# Patient Record
Sex: FEMALE | HISPANIC OR LATINO | Employment: STUDENT | URBAN - METROPOLITAN AREA
[De-identification: names, ages, dates, MRNs, and addresses within clinical notes are randomized per-mention and may not be internally consistent; named-entity substitution may affect disease eponyms.]

---
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
## 2019-04-17 ENCOUNTER — OFFICE VISIT (OUTPATIENT)
Dept: PEDIATRICS CLINIC | Facility: CLINIC | Age: 19
End: 2019-04-17
Payer: COMMERCIAL

## 2019-04-17 VITALS
RESPIRATION RATE: 18 BRPM | HEIGHT: 63 IN | SYSTOLIC BLOOD PRESSURE: 100 MMHG | BODY MASS INDEX: 18.69 KG/M2 | DIASTOLIC BLOOD PRESSURE: 70 MMHG | TEMPERATURE: 97.8 F | WEIGHT: 105.5 LBS | HEART RATE: 84 BPM

## 2019-04-17 DIAGNOSIS — Z00.129 WELL ADOLESCENT VISIT: Primary | ICD-10-CM

## 2019-04-17 DIAGNOSIS — R62.7 POOR WEIGHT GAIN IN ADULT: ICD-10-CM

## 2019-04-17 DIAGNOSIS — Z71.82 EXERCISE COUNSELING: ICD-10-CM

## 2019-04-17 DIAGNOSIS — Z13.220 SCREENING, LIPID: ICD-10-CM

## 2019-04-17 DIAGNOSIS — Z71.3 NUTRITIONAL COUNSELING: ICD-10-CM

## 2019-04-17 DIAGNOSIS — Z13.31 SCREENING FOR DEPRESSION: ICD-10-CM

## 2019-04-17 PROCEDURE — 99395 PREV VISIT EST AGE 18-39: CPT | Performed by: PEDIATRICS

## 2019-04-17 PROCEDURE — 96127 BRIEF EMOTIONAL/BEHAV ASSMT: CPT | Performed by: PEDIATRICS

## 2019-04-17 PROCEDURE — 1036F TOBACCO NON-USER: CPT | Performed by: PEDIATRICS

## 2019-04-17 PROCEDURE — 3008F BODY MASS INDEX DOCD: CPT | Performed by: PEDIATRICS

## 2019-11-02 ENCOUNTER — IMMUNIZATIONS (OUTPATIENT)
Dept: PEDIATRICS CLINIC | Facility: CLINIC | Age: 19
End: 2019-11-02
Payer: COMMERCIAL

## 2019-11-02 DIAGNOSIS — Z23 ENCOUNTER FOR IMMUNIZATION: Primary | ICD-10-CM

## 2019-11-02 PROCEDURE — 90686 IIV4 VACC NO PRSV 0.5 ML IM: CPT | Performed by: PEDIATRICS

## 2019-11-02 PROCEDURE — 90471 IMMUNIZATION ADMIN: CPT | Performed by: PEDIATRICS

## 2019-12-03 ENCOUNTER — OFFICE VISIT (OUTPATIENT)
Dept: URGENT CARE | Age: 19
End: 2019-12-03
Payer: COMMERCIAL

## 2019-12-03 VITALS
BODY MASS INDEX: 18.61 KG/M2 | TEMPERATURE: 98.3 F | RESPIRATION RATE: 16 BRPM | WEIGHT: 105 LBS | HEART RATE: 107 BPM | HEIGHT: 63 IN | SYSTOLIC BLOOD PRESSURE: 100 MMHG | DIASTOLIC BLOOD PRESSURE: 59 MMHG | OXYGEN SATURATION: 98 %

## 2019-12-03 DIAGNOSIS — R10.33 PERIUMBILICAL ABDOMINAL PAIN: Primary | ICD-10-CM

## 2019-12-03 PROCEDURE — 99213 OFFICE O/P EST LOW 20 MIN: CPT | Performed by: FAMILY MEDICINE

## 2019-12-03 PROCEDURE — S9088 SERVICES PROVIDED IN URGENT: HCPCS | Performed by: FAMILY MEDICINE

## 2019-12-03 NOTE — LETTER
December 3, 2019     Patient: Anastasiya Adams   YOB: 2000   Date of Visit: 12/3/2019       To Whom it May Concern:    Anastasiya Adams was seen in my clinic on 12/3/2019  She was unable to attend classes today (12/03/2019)  Patient was brought to our office by her mother  If you have any questions or concerns, please don't hesitate to call           Sincerely,          Michaela Durant,         CC: No Recipients

## 2019-12-03 NOTE — PROGRESS NOTES
[facility]        NAME: Leatha Veronica is a 20 y o  female  : 2000    MRN: [id number]  DATE: December 3, 2019  TIME: 11:46 AM    Assessment and Plan   Periumbilical abdominal pain [R10 33]  1  Periumbilical abdominal pain           Patient Instructions     Patient Instructions   Light/BRAT diet (patient given a dietary instruction sheet)  Recheck/follow-up with family physician as needed  Please go to the hospital emergency department if needed  Follow up with PCP in 2-3 days  Proceed to  ER if symptoms worsen  Chief Complaint     Chief Complaint   Patient presents with    Abdominal Pain     Pt complaining of LUQ pain that started this morning  She has some nausea as well  Denies vomiting and diarrhea  Pt is not having any pain at this time  History of Present Illness       Patient with generalized abdominal pain since this morning with nausea; no vomiting or diarrhea; patient last ate last evening (rice and beans); no urinary complaints; patient states the abdominal pain is improving      Review of Systems   Review of Systems   Gastrointestinal: Positive for abdominal pain and nausea  Negative for diarrhea and vomiting  All other systems reviewed and are negative          Current Medications       Current Outpatient Medications:     cetirizine (ZyrTEC) 10 mg tablet, Take 10 mg by mouth daily, Disp: , Rfl:     EPINEPHrine (EPIPEN 2-JUANI) 0 3 mg/0 3 mL SOAJ, Inject 0 3 mL as directed, Disp: , Rfl:     fluticasone (FLOVENT HFA) 110 MCG/ACT inhaler, Inhale 2 puffs 2 (two) times a day, Disp: , Rfl:     cyproheptadine (PERIACTIN) 4 mg tablet, Take 2 tablets (8 mg total) by mouth daily at bedtime (Patient not taking: Reported on 2019), Disp: 30 tablet, Rfl: 2    levalbuterol (XOPENEX) 1 25 mg/3 mL nebulizer solution, Inhale 1 each, Disp: , Rfl:     montelukast (SINGULAIR) 10 mg tablet, Take 1 tablet by mouth, Disp: , Rfl:     polyethylene glycol (GLYCOLAX) powder, Take 34 g by mouth daily (Patient not taking: Reported on 12/11/2018 ), Disp: 1054 g, Rfl: 0    ranitidine (ZANTAC) 150 mg tablet, Take 1 tablet by mouth every 12 (twelve) hours, Disp: , Rfl:     senna (SENOKOT) 8 6 mg, Take 2 tablets (17 2 mg total) by mouth daily at bedtime (Patient not taking: Reported on 4/17/2019), Disp: 60 each, Rfl: 0    Current Allergies     Allergies as of 12/03/2019 - Reviewed 12/03/2019   Allergen Reaction Noted    Eggs or egg-derived products Hives 09/26/2017    Other Hives, Itching, and Other (See Comments) 01/30/2017    Seasonal ic  [cholestatin]  06/25/2014            The following portions of the patient's history were reviewed and updated as appropriate: allergies, current medications, past family history, past medical history, past social history, past surgical history and problem list      Past Medical History:   Diagnosis Date    Abdominal pain     Allergic     Asthma     Asthma     Chronic abdominal pain     Resolved: 12/07/2017    Chronic diarrhea     Resolved: 07/18/2017    Congenital hemihypertrophy     Early satiety     Exotropia     Gastric mucosal hypertrophy     Leg length discrepancy     Nausea     Occasional tremors     Orthostasis     Pallor     Resolved: 01/16/2017    Poor appetite     RAD (reactive airway disease)     Syncope     Resolved: 01/16/2017    Weight loss        Past Surgical History:   Procedure Laterality Date    ESOPHAGOGASTRODUODENOSCOPY      with biopsy    FOOT SURGERY      MULTIPLE TOOTH EXTRACTIONS N/A 1/30/2017    Procedure: COMPLETE BONY IMPACTED EXTRACTION TEETH # 1, 16, 17, 32;  Surgeon: Smith Burciaga DMD;  Location: [facility];  Service:     OVARIAN CYST REMOVAL      SD EGD TRANSORAL BIOPSY SINGLE/MULTIPLE N/A 9/26/2017    Procedure: ESOPHAGOGASTRODUODENOSCOPY (EGD); Surgeon: Yamilet Varela MD;  Location: [facility];   Service: Pediatric Gastrointestinal       Family History   Problem Relation Age of Onset    No Known Problems Mother     No Known Problems Father     Asthma Sister     Diabetes Maternal Grandmother     Kidney cancer Family     Asthma Family     Heart disease Family     Asthma Other     Kidney cancer Family     Other Family         Cardiac disorder    Miscarriages / Stillbirths Neg Hx     Substance Abuse Neg Hx          Medications have been verified  Objective   /59 (BP Location: Left arm, Patient Position: Sitting)   Pulse (!) 107   Temp 98 3 °F (36 8 °C) (Temporal)   Resp 16   Ht 5' 3" (1 6 m)   Wt 47 6 kg (105 lb)   LMP 11/16/2019   SpO2 98%   BMI 18 60 kg/m²        Physical Exam     Physical Exam   Constitutional: She is oriented to person, place, and time  HENT:   Mouth/Throat: Oropharynx is clear and moist    Cardiovascular: Normal rate, regular rhythm and normal heart sounds  Pulmonary/Chest: Effort normal and breath sounds normal    Abdominal: Soft  Normal appearance and bowel sounds are normal  She exhibits no distension  There is generalized tenderness and tenderness in the periumbilical area  There is no rigidity and no guarding  Neurological: She is alert and oriented to person, place, and time  No nuchal rigidity   Skin:   Fair color and turgor   Psychiatric: She has a normal mood and affect  Her behavior is normal    Nursing note and vitals reviewed

## 2019-12-03 NOTE — PATIENT INSTRUCTIONS
Light/BRAT diet (patient given a dietary instruction sheet)  Recheck/follow-up with family physician as needed  Please go to the hospital emergency department if needed

## 2020-05-22 ENCOUNTER — TELEPHONE (OUTPATIENT)
Dept: GASTROENTEROLOGY | Facility: CLINIC | Age: 20
End: 2020-05-22

## 2020-05-26 ENCOUNTER — TELEMEDICINE (OUTPATIENT)
Dept: GASTROENTEROLOGY | Facility: CLINIC | Age: 20
End: 2020-05-26
Payer: COMMERCIAL

## 2020-05-26 DIAGNOSIS — R63.0 ANOREXIA: Primary | ICD-10-CM

## 2020-05-26 DIAGNOSIS — R53.83 FATIGUE, UNSPECIFIED TYPE: ICD-10-CM

## 2020-05-26 DIAGNOSIS — R63.4 WEIGHT LOSS: ICD-10-CM

## 2020-05-26 PROCEDURE — 99214 OFFICE O/P EST MOD 30 MIN: CPT | Performed by: PEDIATRICS

## 2020-05-26 RX ORDER — CYPROHEPTADINE HYDROCHLORIDE 4 MG/1
8 TABLET ORAL
Qty: 42 TABLET | Refills: 3 | Status: SHIPPED | OUTPATIENT
Start: 2020-05-26 | End: 2020-08-24 | Stop reason: ALTCHOICE

## 2020-06-12 ENCOUNTER — TELEPHONE (OUTPATIENT)
Dept: PSYCHIATRY | Facility: CLINIC | Age: 20
End: 2020-06-12

## 2020-08-24 ENCOUNTER — OFFICE VISIT (OUTPATIENT)
Dept: FAMILY MEDICINE CLINIC | Facility: CLINIC | Age: 20
End: 2020-08-24
Payer: COMMERCIAL

## 2020-08-24 VITALS
SYSTOLIC BLOOD PRESSURE: 110 MMHG | HEART RATE: 96 BPM | HEIGHT: 62 IN | RESPIRATION RATE: 16 BRPM | TEMPERATURE: 97.7 F | OXYGEN SATURATION: 100 % | WEIGHT: 103 LBS | DIASTOLIC BLOOD PRESSURE: 64 MMHG | BODY MASS INDEX: 18.95 KG/M2

## 2020-08-24 DIAGNOSIS — J45.20 MILD INTERMITTENT ASTHMA WITHOUT COMPLICATION: Primary | ICD-10-CM

## 2020-08-24 DIAGNOSIS — F32.A DEPRESSION, UNSPECIFIED DEPRESSION TYPE: ICD-10-CM

## 2020-08-24 PROBLEM — R10.9 CHRONIC ABDOMINAL PAIN: Status: RESOLVED | Noted: 2017-09-26 | Resolved: 2020-08-24

## 2020-08-24 PROBLEM — G89.29 CHRONIC ABDOMINAL PAIN: Status: RESOLVED | Noted: 2017-09-26 | Resolved: 2020-08-24

## 2020-08-24 PROCEDURE — 99203 OFFICE O/P NEW LOW 30 MIN: CPT | Performed by: FAMILY MEDICINE

## 2020-08-24 PROCEDURE — 1036F TOBACCO NON-USER: CPT | Performed by: FAMILY MEDICINE

## 2020-08-24 PROCEDURE — 3008F BODY MASS INDEX DOCD: CPT | Performed by: FAMILY MEDICINE

## 2020-08-24 NOTE — PROGRESS NOTES
Chief Complaint   Patient presents with   Elizabeth Chapin Establish Care     New patient  Health Maintenance   Topic Date Due    Pneumococcal Vaccine: Pediatrics (0 to 5 Years) and At-Risk Patients (6 to 59 Years) (1 of 1 - PPSV23) 11/07/2005    HIV Screening  11/07/2014    Annual Physical  04/17/2020    Influenza Vaccine  07/01/2020    DTaP,Tdap,and Td Vaccines (7 - Td) 06/28/2021    Depression Screening PHQ  08/24/2021    BMI: Adult  08/24/2021    HIB Vaccine  Completed    Hepatitis B Vaccine  Completed    Hepatitis A Vaccine  Completed    HPV Vaccine  Completed    IPV Vaccine  Aged Out    Meningococcal ACWY Vaccine  Aged Out       Assessment/Plan:    Mild intermittent asthma without complication  Controlled  Use xopenex as needed  Depression  Denies current depression  Refused therapist or psychiatrist      Belgica Draper pt to get flu shot  RTO in 6 months for PE  Diagnoses and all orders for this visit:    Mild intermittent asthma without complication    Depression, unspecified depression type           Subjective:      Patient ID: Baldomero Clements is a 20 y o  female  HPI    Pt is here to establish care  Seen her pediatrician last year  Asthma---controlled  Use xopenex as needed  No flare up for a while  Cold cause her flare up sometimes  She has epipen but she never used it  LMP 8/10/2020  Regular menstrual period  Normal bleeding  First day cramping, ibuprofen helped  Eat healthy  No exercise  No smoking  No alcohol  No drugs  Sometimes depression  No depression now  Denies HI/SI  The following portions of the patient's history were reviewed and updated as appropriate: allergies, current medications, past family history, past medical history, past social history, past surgical history and problem list     Review of Systems   Constitutional: Negative for appetite change, chills and fever  HENT: Negative for congestion, ear pain, sinus pain and sore throat  Eyes: Negative for discharge and itching  Respiratory: Negative for apnea, cough, chest tightness, shortness of breath and wheezing  Cardiovascular: Negative for chest pain, palpitations and leg swelling  Gastrointestinal: Negative for abdominal pain, anal bleeding, constipation, diarrhea, nausea and vomiting  Endocrine: Negative for cold intolerance, heat intolerance and polyuria  Genitourinary: Negative for difficulty urinating and dysuria  Musculoskeletal: Negative for arthralgias, back pain and myalgias  Skin: Negative for rash  Neurological: Negative for dizziness and headaches  Psychiatric/Behavioral: Negative for agitation, self-injury, sleep disturbance and suicidal ideas  The patient is not nervous/anxious  Objective:      /64 (BP Location: Left arm, Patient Position: Sitting, Cuff Size: Adult)   Pulse 96   Temp 97 7 °F (36 5 °C) (Oral)   Resp 16   Ht 5' 2" (1 575 m)   Wt 46 7 kg (103 lb)   SpO2 100%   BMI 18 84 kg/m²          Physical Exam  Constitutional:       General: She is not in acute distress  Appearance: She is well-developed  HENT:      Head: Normocephalic  Eyes:      General:         Right eye: No discharge  Left eye: No discharge  Conjunctiva/sclera: Conjunctivae normal    Neck:      Musculoskeletal: Normal range of motion and neck supple  No muscular tenderness  Thyroid: No thyromegaly  Cardiovascular:      Rate and Rhythm: Normal rate and regular rhythm  Heart sounds: Normal heart sounds  No murmur  No friction rub  No gallop  Pulmonary:      Effort: Pulmonary effort is normal  No respiratory distress  Breath sounds: Normal breath sounds  No wheezing or rales  Chest:      Chest wall: No tenderness  Abdominal:      General: Bowel sounds are normal  There is no distension  Palpations: Abdomen is soft  There is no mass  Tenderness: There is no abdominal tenderness  There is no guarding or rebound  Musculoskeletal: Normal range of motion  General: No swelling, tenderness or deformity  Lymphadenopathy:      Cervical: No cervical adenopathy  Neurological:      Mental Status: She is alert

## 2020-11-02 ENCOUNTER — IMMUNIZATIONS (OUTPATIENT)
Dept: FAMILY MEDICINE CLINIC | Facility: CLINIC | Age: 20
End: 2020-11-02
Payer: COMMERCIAL

## 2020-11-02 DIAGNOSIS — Z23 ENCOUNTER FOR IMMUNIZATION: ICD-10-CM

## 2020-11-02 PROCEDURE — 90471 IMMUNIZATION ADMIN: CPT

## 2020-11-02 PROCEDURE — 90686 IIV4 VACC NO PRSV 0.5 ML IM: CPT

## 2020-11-16 ENCOUNTER — OFFICE VISIT (OUTPATIENT)
Dept: FAMILY MEDICINE CLINIC | Facility: CLINIC | Age: 20
End: 2020-11-16
Payer: COMMERCIAL

## 2020-11-16 VITALS
SYSTOLIC BLOOD PRESSURE: 100 MMHG | BODY MASS INDEX: 18.77 KG/M2 | WEIGHT: 102 LBS | RESPIRATION RATE: 16 BRPM | DIASTOLIC BLOOD PRESSURE: 54 MMHG | HEIGHT: 62 IN | HEART RATE: 108 BPM | OXYGEN SATURATION: 99 % | TEMPERATURE: 98.4 F

## 2020-11-16 DIAGNOSIS — H93.13 TINNITUS OF BOTH EARS: Primary | ICD-10-CM

## 2020-11-16 DIAGNOSIS — N64.4 BREAST TENDERNESS IN FEMALE: ICD-10-CM

## 2020-11-16 DIAGNOSIS — H61.22 IMPACTED CERUMEN OF LEFT EAR: ICD-10-CM

## 2020-11-16 PROCEDURE — 99213 OFFICE O/P EST LOW 20 MIN: CPT | Performed by: FAMILY MEDICINE

## 2020-11-18 ENCOUNTER — TELEPHONE (OUTPATIENT)
Dept: FAMILY MEDICINE CLINIC | Facility: CLINIC | Age: 20
End: 2020-11-18

## 2020-11-18 DIAGNOSIS — N64.4 BREAST TENDERNESS IN FEMALE: Primary | ICD-10-CM

## 2020-11-27 ENCOUNTER — HOSPITAL ENCOUNTER (OUTPATIENT)
Dept: ULTRASOUND IMAGING | Facility: CLINIC | Age: 20
Discharge: HOME | End: 2020-11-27
Payer: COMMERCIAL

## 2020-11-27 VITALS — WEIGHT: 102 LBS | BODY MASS INDEX: 18.77 KG/M2 | HEIGHT: 62 IN

## 2020-11-27 DIAGNOSIS — N64.4 BREAST TENDERNESS IN FEMALE: ICD-10-CM

## 2020-11-27 PROCEDURE — 76642 ULTRASOUND BREAST LIMITED: CPT

## 2021-03-02 ENCOUNTER — OFFICE VISIT (OUTPATIENT)
Dept: FAMILY MEDICINE CLINIC | Facility: CLINIC | Age: 21
End: 2021-03-02
Payer: COMMERCIAL

## 2021-03-02 VITALS
BODY MASS INDEX: 19.14 KG/M2 | DIASTOLIC BLOOD PRESSURE: 64 MMHG | HEART RATE: 80 BPM | SYSTOLIC BLOOD PRESSURE: 110 MMHG | RESPIRATION RATE: 16 BRPM | WEIGHT: 104 LBS | HEIGHT: 62 IN | TEMPERATURE: 98.4 F

## 2021-03-02 DIAGNOSIS — Z12.4 SCREENING FOR CERVICAL CANCER: ICD-10-CM

## 2021-03-02 DIAGNOSIS — Z00.00 ANNUAL PHYSICAL EXAM: Primary | ICD-10-CM

## 2021-03-02 PROCEDURE — 99395 PREV VISIT EST AGE 18-39: CPT | Performed by: FAMILY MEDICINE

## 2021-03-02 NOTE — PATIENT INSTRUCTIONS

## 2021-03-02 NOTE — PROGRESS NOTES
Chief Complaint   Patient presents with    Annual Exam     Health Maintenance   Topic Date Due    Pneumococcal Vaccine: Pediatrics (0 to 5 Years) and At-Risk Patients (6 to 59 Years) (1 of 1 - PPSV23) 11/07/2005    HIV Screening  11/07/2014    BMI: Followup Plan  11/07/2017    Annual Physical  04/17/2020    Cervical Cancer Screening  11/07/2020    DTaP,Tdap,and Td Vaccines (7 - Td) 06/28/2021    Depression Remission PHQ  11/16/2021    BMI: Adult  12/09/2021    HIB Vaccine  Completed    Hepatitis B Vaccine  Completed    Hepatitis A Vaccine  Completed    Influenza Vaccine  Completed    HPV Vaccine  Completed    IPV Vaccine  Aged Out    Meningococcal ACWY Vaccine  Aged Out     Assessment/Plan:    No problem-specific Assessment & Plan notes found for this encounter  Interested in Chávez Matteo vaccine  {Assess/PlanSmartLinks:70338}      Subjective:      Patient ID: Mauricio Aragon is a 23 y o  female      HPI    {Common ambulatory SmartLinks:92234}    Review of Systems      Objective:      /64   Pulse 80   Temp 98 4 °F (36 9 °C) (Tympanic)   Resp 16   Ht 5' 2" (1 575 m)   Wt 47 2 kg (104 lb)   BMI 19 02 kg/m²          Physical Exam

## 2021-03-02 NOTE — PROGRESS NOTES
[facility]    NAME: Dylon Post  AGE: 23 y o  SEX: female  : 2000     DATE: 3/2/2021     Assessment and Plan:     Problem List Items Addressed This Visit     None      Visit Diagnoses     Annual physical exam    -  Primary    Relevant Orders    CBC and differential    Comprehensive metabolic panel    TSH, 3rd generation with Free T4 reflex    Screening for cervical cancer        Relevant Orders    Ambulatory referral to Obstetrics / Gynecology          Immunizations and preventive care screenings were discussed with patient today  Appropriate education was printed on patient's after visit summary  Counseling:  Alcohol/drug use: discussed moderation in alcohol intake, the recommendations for healthy alcohol use, and avoidance of illicit drug use  Dental Health: discussed importance of regular tooth brushing, flossing, and dental visits  Injury prevention: discussed safety/seat belts, safety helmets, smoke detectors, carbon dioxide detectors, and smoking near bedding or upholstery  Sexual health: discussed sexually transmitted diseases, partner selection, use of condoms, avoidance of unintended pregnancy, and contraceptive alternatives  · Exercise: the importance of regular exercise/physical activity was discussed  Recommend exercise 3-5 times per week for at least 30 minutes  Depression Screening and Follow-up Plan: Patient's depression screening was positive with a PHQ-2 score of 0  Their PHQ-9 score was 0  Patient advised to follow-up with PCP for further management  Return in about 1 year (around 3/2/2022) for Annual physical      Chief Complaint:     Chief Complaint   Patient presents with    Annual Exam      History of Present Illness:     Adult Annual Physical   Patient here for a comprehensive physical exam  The patient reports no problems      Diet and Physical Activity  · Diet/Nutrition: well balanced diet  · Exercise: no formal exercise  Depression Screening  PHQ-9 Depression Screening    PHQ-9:   Frequency of the following problems over the past two weeks:      Little interest or pleasure in doing things: 0 - not at all  Feeling down, depressed, or hopeless: 0 - not at all  Trouble falling or staying asleep, or sleeping too much: 0 - not at all  Feeling tired or having little energy: 0 - not at all  Poor appetite or overeatin - not at all  Feeling bad about yourself - or that you are a failure or have let yourself or your family down: 0 - not at all  Trouble concentrating on things, such as reading the newspaper or watching television: 0 - not at all  Moving or speaking so slowly that other people could have noticed  Or the opposite - being so fidgety or restless that you have been moving around a lot more than usual: 0 - not at all  Thoughts that you would be better off dead, or of hurting yourself in some way: 0 - not at all  PHQ-2 Score: 0  PHQ-9 Score: 0       General Health  · Sleep: sleeps well  · Hearing: normal - bilateral   · Vision: no vision problems  · Dental: regular dental visits  /GYN Health  · Never had pap before  · Regular menstrual period     Review of Systems:     Review of Systems   Constitutional: Negative for appetite change, chills and fever  HENT: Negative for congestion, ear pain, sinus pain and sore throat  Eyes: Negative for discharge and itching  Respiratory: Negative for apnea, cough, chest tightness, shortness of breath and wheezing  Cardiovascular: Negative for chest pain, palpitations and leg swelling  Gastrointestinal: Negative for abdominal pain, anal bleeding, constipation, diarrhea, nausea and vomiting  Endocrine: Negative for cold intolerance, heat intolerance and polyuria  Genitourinary: Negative for difficulty urinating and dysuria  Musculoskeletal: Negative for arthralgias, back pain and myalgias     Skin: Negative for rash    Neurological: Negative for dizziness and headaches  Psychiatric/Behavioral: Negative for agitation  Past Medical History:     Past Medical History:   Diagnosis Date    Abdominal pain     Allergic     Asthma     Asthma     Chronic abdominal pain     Resolved: 12/07/2017    Chronic diarrhea     Resolved: 07/18/2017    Congenital hemihypertrophy     Early satiety     Exotropia     Gastric mucosal hypertrophy     Leg length discrepancy     Nausea     Occasional tremors     Orthostasis     Pallor     Resolved: 01/16/2017    Poor appetite     RAD (reactive airway disease)     Syncope     Resolved: 01/16/2017    Weight loss       Past Surgical History:     Past Surgical History:   Procedure Laterality Date    ESOPHAGOGASTRODUODENOSCOPY      with biopsy    FOOT SURGERY      MULTIPLE TOOTH EXTRACTIONS N/A 1/30/2017    Procedure: COMPLETE BONY IMPACTED EXTRACTION TEETH # 1, 16, 17, 32;  Surgeon: Renan Monte DMD;  [facility];  Service:     OVARIAN CYST REMOVAL      OK EGD TRANSORAL BIOPSY SINGLE/MULTIPLE N/A 9/26/2017    Procedure: ESOPHAGOGASTRODUODENOSCOPY (EGD); Surgeon: Carmen Goldberg MD;  Location: [facility];   Service: Pediatric Gastrointestinal      Social History:     E-Cigarette/Vaping    E-Cigarette Use Never User      E-Cigarette/Vaping Substances    Nicotine No     THC No     CBD No     Flavoring No     Other No     Unknown No      Social History     Socioeconomic History    Marital status: Single     Spouse name: None    Number of children: None    Years of education: None    Highest education level: None   Occupational History    Occupation: Full time student   Social Needs    Financial resource strain: Not hard at all   Rolla-Yahaira insecurity     Worry: Never true     Inability: Never true    Transportation needs     Medical: No     Non-medical: No   Tobacco Use    Smoking status: Never Smoker    Smokeless tobacco: Never Used   Substance and Sexual Activity    Alcohol use: No    Drug use: No    Sexual activity: Never   Lifestyle    Physical activity     Days per week: 0 days     Minutes per session: 0 min    Stress: To some extent   Relationships    Social connections     Talks on phone: More than three times a week     Gets together: Never     Attends Cheondoism service: Never     Active member of club or organization: No     Attends meetings of clubs or organizations: Never     Relationship status: Never     Intimate partner violence     Fear of current or ex partner: No     Emotionally abused: No     Physically abused: No     Forced sexual activity: No   Other Topics Concern    None   Social History Narrative    Brushes teeth twice a day    Lives with parents    No tobacco/smoke exposure    Pets/animals: Dog    Sleeps 8 - 10 hours a day      Family History:     Family History   Problem Relation Age of Onset    No Known Problems Mother     No Known Problems Father     Asthma Sister     Diabetes Maternal Grandmother     Kidney cancer Family     Asthma Family     Heart disease Family     Asthma Other     Kidney cancer Family     Other Family         Cardiac disorder    Asthma Brother     Cancer Paternal Grandfather     No Known Problems Maternal Grandfather     No Known Problems Paternal Grandmother     No Known Problems Maternal Aunt     No Known Problems Maternal Aunt     No Known Problems Maternal Aunt     No Known Problems Maternal Aunt     No Known Problems Paternal Aunt     Miscarriages / Stillbirths Neg Hx     Substance Abuse Neg Hx       Current Medications:     Current Outpatient Medications   Medication Sig Dispense Refill    EPINEPHrine (EPIPEN 2-JUANI) 0 3 mg/0 3 mL SOAJ Inject 0 3 mL as directed      levalbuterol (XOPENEX) 1 25 mg/3 mL nebulizer solution Inhale 1 each       No current facility-administered medications for this visit  Allergies:      Allergies   Allergen Reactions    Eggs Or Egg-Derived Products (Food Allergy) Hives    Other Hives, Itching and Other (See Comments)     Cold water/temperatures  Pt has passed out from the cold  "cold urticaria"    Seasonal Ic  [Cholestatin]       Physical Exam:     /64   Pulse 80   Temp 98 4 °F (36 9 °C) (Tympanic)   Resp 16   Ht 5' 2" (1 575 m)   Wt 47 2 kg (104 lb)   BMI 19 02 kg/m²     Physical Exam  Vitals signs reviewed  Constitutional:       Appearance: Normal appearance  HENT:      Head: Normocephalic and atraumatic  Eyes:      General:         Right eye: No discharge  Left eye: No discharge  Conjunctiva/sclera: Conjunctivae normal    Neck:      Musculoskeletal: Normal range of motion and neck supple  No muscular tenderness  Vascular: No carotid bruit  Cardiovascular:      Rate and Rhythm: Normal rate and regular rhythm  Heart sounds: Normal heart sounds  No murmur  No friction rub  No gallop  Pulmonary:      Effort: Pulmonary effort is normal  No respiratory distress  Breath sounds: Normal breath sounds  No wheezing or rales  Abdominal:      General: Bowel sounds are normal  There is no distension  Palpations: Abdomen is soft  Tenderness: There is no abdominal tenderness  Musculoskeletal: Normal range of motion  General: No swelling, tenderness or deformity  Lymphadenopathy:      Cervical: No cervical adenopathy  Neurological:      Mental Status: She is alert     Psychiatric:         Mood and Affect: Mood normal           Meghann Logan MD   [address] [place] [address]

## 2021-03-16 ENCOUNTER — OFFICE VISIT (OUTPATIENT)
Dept: URGENT CARE | Age: 21
End: 2021-03-16
Payer: COMMERCIAL

## 2021-03-16 VITALS
DIASTOLIC BLOOD PRESSURE: 64 MMHG | BODY MASS INDEX: 18.4 KG/M2 | OXYGEN SATURATION: 100 % | RESPIRATION RATE: 16 BRPM | TEMPERATURE: 97.9 F | HEART RATE: 70 BPM | SYSTOLIC BLOOD PRESSURE: 118 MMHG | HEIGHT: 62 IN | WEIGHT: 100 LBS

## 2021-03-16 DIAGNOSIS — J02.0 STREP PHARYNGITIS: Primary | ICD-10-CM

## 2021-03-16 LAB — S PYO AG THROAT QL: POSITIVE

## 2021-03-16 PROCEDURE — 87880 STREP A ASSAY W/OPTIC: CPT | Performed by: NURSE PRACTITIONER

## 2021-03-16 PROCEDURE — G0382 LEV 3 HOSP TYPE B ED VISIT: HCPCS | Performed by: NURSE PRACTITIONER

## 2021-03-16 RX ORDER — AMOXICILLIN 875 MG/1
875 TABLET, COATED ORAL 2 TIMES DAILY
Qty: 14 TABLET | Refills: 0 | Status: SHIPPED | OUTPATIENT
Start: 2021-03-16 | End: 2021-03-19 | Stop reason: ALTCHOICE

## 2021-03-16 NOTE — PATIENT INSTRUCTIONS
Strep Throat   WHAT YOU NEED TO KNOW:   Strep throat is a throat infection caused by bacteria  It is easily spread from person to person  DISCHARGE INSTRUCTIONS:   Call 911 for any of the following:   · You have trouble breathing  Return to the emergency department if:   · You have new symptoms like a bad headache, stiff neck, chest pain, or vomiting  · You are drooling because you cannot swallow your spit  Contact your healthcare provider if:   · You have a fever  · You have a rash or ear pain  · You have green, yellow-brown, or bloody mucus when you cough or blow your nose  · You are unable to drink anything  · You have questions or concerns about your condition or care  Medicines:   · Antibiotics  help treat your strep throat  You should feel better within 2 to 3 days after you start antibiotics  · Take your medicine as directed  Contact your healthcare provider if you think your medicine is not helping or if you have side effects  Tell him or her if you are allergic to any medicine  Keep a list of the medicines, vitamins, and herbs you take  Include the amounts, and when and why you take them  Bring the list or the pill bottles to follow-up visits  Carry your medicine list with you in case of an emergency  Manage your symptoms:   · Use lozenges, ice, soft foods, or popsicles  to soothe your throat  · Drink juice, milk shakes, or soup  if your throat is too sore to eat solid food  Drinking liquids can also help prevent dehydration  · Gargle with salt water  Mix ¼ teaspoon salt in a glass of warm water and gargle  This may help reduce swelling in your throat  · Do not smoke  Nicotine and other chemicals in cigarettes and cigars can cause lung damage and make your symptoms worse  Ask your healthcare provider for information if you currently smoke and need help to quit  E-cigarettes or smokeless tobacco still contain nicotine   Talk to your healthcare provider before you use these products  Return to work or school  24 hours after you start antibiotic medicine  Prevent the spread of strep throat:   · Wash your hands often  Use soap and water  Wash your hands after you use the bathroom, change a child's diapers, or sneeze  Wash your hands before you prepare or eat food  · Do not share food or drinks  Replace your toothbrush after you have taken antibiotics for 24 hours  Follow up with your healthcare provider as directed:  Write down your questions so you remember to ask them during your visits  © Copyright [facility] [address] Information is for End User's use only and may not be sold, redistributed or otherwise used for commercial purposes  All illustrations and images included in CareNotes® are the copyrighted property of [employer]  or [facility] [place][note 2] Deshaun Alexander   The above information is an  only  It is not intended as medical advice for individual conditions or treatments  Talk to your doctor, nurse or pharmacist before following any medical regimen to see if it is safe and effective for you

## 2021-03-16 NOTE — PROGRESS NOTES
[facility]        NAME: Leatha Veronica is a 23 y o  female  : 2000    MRN: [id number]  DATE: 2021  TIME: 4:57 PM    Assessment and Plan   Strep pharyngitis [J02 0]  1  Strep pharyngitis  POCT rapid strepA    amoxicillin (AMOXIL) 875 mg tablet         Patient Instructions     Take med as directed  Tylenol prn for pain  Rapid strep is positive for strep  Follow up with PCP in 3-5 days  Proceed to  ER if symptoms worsen  Chief Complaint     Chief Complaint   Patient presents with    Sore Throat     Pt complaining of sore throat and white spots in her throat x1 day  History of Present Illness       HPI   Reports sore throat since yesterday  Here for eval  Brought to clinic by mother  Review of Systems   Review of Systems   Constitutional: Negative for chills and fever  HENT: Positive for sore throat  Negative for congestion, rhinorrhea and trouble swallowing  Respiratory: Negative for shortness of breath and wheezing  Cardiovascular: Negative for chest pain  Gastrointestinal: Negative for nausea and vomiting  Neurological: Negative for dizziness and headaches           Current Medications       Current Outpatient Medications:     amoxicillin (AMOXIL) 875 mg tablet, Take 1 tablet (875 mg total) by mouth 2 (two) times a day for 7 days, Disp: 14 tablet, Rfl: 0    EPINEPHrine (EPIPEN 2-JUANI) 0 3 mg/0 3 mL SOAJ, Inject 0 3 mL as directed, Disp: , Rfl:     levalbuterol (XOPENEX) 1 25 mg/3 mL nebulizer solution, Inhale 1 each, Disp: , Rfl:     Current Allergies     Allergies as of 2021 - Reviewed 2021   Allergen Reaction Noted    Eggs or egg-derived products Hives 2017    Other Hives, Itching, and Other (See Comments) 2017    Seasonal ic  [cholestatin]  2014            The following portions of the patient's history were reviewed and updated as appropriate: allergies, current medications, past family history, past medical history, past social history, past surgical history and problem list      Past Medical History:   Diagnosis Date    Abdominal pain     Allergic     Asthma     Asthma     Chronic abdominal pain     Resolved: 12/07/2017    Chronic diarrhea     Resolved: 07/18/2017    Congenital hemihypertrophy     Early satiety     Exotropia     Gastric mucosal hypertrophy     Leg length discrepancy     Nausea     Occasional tremors     Orthostasis     Pallor     Resolved: 01/16/2017    Poor appetite     RAD (reactive airway disease)     Syncope     Resolved: 01/16/2017    Weight loss        Past Surgical History:   Procedure Laterality Date    ESOPHAGOGASTRODUODENOSCOPY      with biopsy    FOOT SURGERY      MULTIPLE TOOTH EXTRACTIONS N/A 1/30/2017    Procedure: COMPLETE BONY IMPACTED EXTRACTION TEETH # 1, 16, 17, 32;  Surgeon: Luciano Giron DMD;  [facility];  Service:     OVARIAN CYST REMOVAL      GA EGD TRANSORAL BIOPSY SINGLE/MULTIPLE N/A 9/26/2017    Procedure: ESOPHAGOGASTRODUODENOSCOPY (EGD); Surgeon: Deneise Paget, MD;  Location: [facility]; Service: Pediatric Gastrointestinal       Family History   Problem Relation Age of Onset    No Known Problems Mother     No Known Problems Father     Asthma Sister     Diabetes Maternal Grandmother     Kidney cancer Family     Asthma Family     Heart disease Family     Asthma Other     Kidney cancer Family     Other Family         Cardiac disorder    Asthma Brother     Cancer Paternal Grandfather     No Known Problems Maternal Grandfather     No Known Problems Paternal Grandmother     No Known Problems Maternal Aunt     No Known Problems Maternal Aunt     No Known Problems Maternal Aunt     No Known Problems Maternal Aunt     No Known Problems Paternal Aunt     Miscarriages / Stillbirths Neg Hx     Substance Abuse Neg Hx          Medications have been verified          Objective   /64 (BP Location: Right arm, Patient Position: Sitting) Pulse 70   Temp 97 9 °F (36 6 °C) (Temporal)   Resp 16   Ht 5' 2" (1 575 m)   Wt 45 4 kg (100 lb)   LMP 03/14/2021   SpO2 100%   BMI 18 29 kg/m²   Patient's last menstrual period was 03/14/2021  Physical Exam     Physical Exam  HENT:      Right Ear: Tympanic membrane and ear canal normal       Left Ear: Tympanic membrane and ear canal normal       Mouth/Throat:      Mouth: Mucous membranes are moist       Pharynx: Posterior oropharyngeal erythema (tonsils) present  Tonsils: Tonsillar exudate present  2+ on the right  2+ on the left  Cardiovascular:      Rate and Rhythm: Normal rate and regular rhythm  Pulmonary:      Breath sounds: Normal breath sounds  Neurological:      Mental Status: She is alert

## 2021-03-19 ENCOUNTER — TELEPHONE (OUTPATIENT)
Dept: FAMILY MEDICINE CLINIC | Facility: CLINIC | Age: 21
End: 2021-03-19

## 2021-03-19 DIAGNOSIS — J02.0 PHARYNGITIS DUE TO STREPTOCOCCUS SPECIES: Primary | ICD-10-CM

## 2021-03-19 RX ORDER — AZITHROMYCIN 250 MG/1
TABLET, FILM COATED ORAL
Qty: 6 TABLET | Refills: 0 | Status: SHIPPED | OUTPATIENT
Start: 2021-03-19 | End: 2021-03-23

## 2021-03-19 NOTE — TELEPHONE ENCOUNTER
Pt went to Urgent care on Tuesday was prescribed Amoxacillin but pt does not feel any improvement  Mom would like to know if antibiotic could be changed  Strep test in chart

## 2021-03-30 DIAGNOSIS — Z23 ENCOUNTER FOR IMMUNIZATION: ICD-10-CM

## 2021-04-08 ENCOUNTER — IMMUNIZATIONS (OUTPATIENT)
Dept: FAMILY MEDICINE CLINIC | Facility: HOSPITAL | Age: 21
End: 2021-04-08

## 2021-04-08 DIAGNOSIS — Z23 ENCOUNTER FOR IMMUNIZATION: Primary | ICD-10-CM

## 2021-04-08 PROCEDURE — 0001A SARS-COV-2 / COVID-19 MRNA VACCINE (PFIZER-BIONTECH) 30 MCG: CPT

## 2021-04-08 PROCEDURE — 91300 SARS-COV-2 / COVID-19 MRNA VACCINE (PFIZER-BIONTECH) 30 MCG: CPT

## 2021-04-24 ENCOUNTER — LAB (OUTPATIENT)
Dept: LAB | Age: 21
End: 2021-04-24
Payer: COMMERCIAL

## 2021-04-24 DIAGNOSIS — Z00.00 ANNUAL PHYSICAL EXAM: ICD-10-CM

## 2021-04-24 LAB
ALBUMIN SERPL BCP-MCNC: 3.7 G/DL (ref 3.5–5)
ALP SERPL-CCNC: 46 U/L (ref 46–116)
ALT SERPL W P-5'-P-CCNC: 21 U/L (ref 12–78)
ANION GAP SERPL CALCULATED.3IONS-SCNC: 4 MMOL/L (ref 4–13)
AST SERPL W P-5'-P-CCNC: 14 U/L (ref 5–45)
BASOPHILS # BLD AUTO: 0.02 THOUSANDS/ΜL (ref 0–0.1)
BASOPHILS NFR BLD AUTO: 1 % (ref 0–1)
BILIRUB SERPL-MCNC: 0.34 MG/DL (ref 0.2–1)
BUN SERPL-MCNC: 6 MG/DL (ref 5–25)
CALCIUM SERPL-MCNC: 8.8 MG/DL (ref 8.3–10.1)
CHLORIDE SERPL-SCNC: 109 MMOL/L (ref 100–108)
CO2 SERPL-SCNC: 28 MMOL/L (ref 21–32)
CREAT SERPL-MCNC: 0.72 MG/DL (ref 0.6–1.3)
EOSINOPHIL # BLD AUTO: 0.28 THOUSAND/ΜL (ref 0–0.61)
EOSINOPHIL NFR BLD AUTO: 6 % (ref 0–6)
ERYTHROCYTE [DISTWIDTH] IN BLOOD BY AUTOMATED COUNT: 13.2 % (ref 11.6–15.1)
GFR SERPL CREATININE-BSD FRML MDRD: 120 ML/MIN/1.73SQ M
GLUCOSE P FAST SERPL-MCNC: 85 MG/DL (ref 65–99)
HCT VFR BLD AUTO: 36.8 % (ref 34.8–46.1)
HGB BLD-MCNC: 11.9 G/DL (ref 11.5–15.4)
IMM GRANULOCYTES # BLD AUTO: 0.01 THOUSAND/UL (ref 0–0.2)
IMM GRANULOCYTES NFR BLD AUTO: 0 % (ref 0–2)
LYMPHOCYTES # BLD AUTO: 2.13 THOUSANDS/ΜL (ref 0.6–4.47)
LYMPHOCYTES NFR BLD AUTO: 48 % (ref 14–44)
MCH RBC QN AUTO: 29 PG (ref 26.8–34.3)
MCHC RBC AUTO-ENTMCNC: 32.3 G/DL (ref 31.4–37.4)
MCV RBC AUTO: 90 FL (ref 82–98)
MONOCYTES # BLD AUTO: 0.38 THOUSAND/ΜL (ref 0.17–1.22)
MONOCYTES NFR BLD AUTO: 9 % (ref 4–12)
NEUTROPHILS # BLD AUTO: 1.56 THOUSANDS/ΜL (ref 1.85–7.62)
NEUTS SEG NFR BLD AUTO: 36 % (ref 43–75)
NRBC BLD AUTO-RTO: 0 /100 WBCS
PLATELET # BLD AUTO: 191 THOUSANDS/UL (ref 149–390)
PMV BLD AUTO: 10.9 FL (ref 8.9–12.7)
POTASSIUM SERPL-SCNC: 3.8 MMOL/L (ref 3.5–5.3)
PROT SERPL-MCNC: 7.4 G/DL (ref 6.4–8.2)
RBC # BLD AUTO: 4.1 MILLION/UL (ref 3.81–5.12)
SODIUM SERPL-SCNC: 141 MMOL/L (ref 136–145)
TSH SERPL DL<=0.05 MIU/L-ACNC: 2.37 UIU/ML (ref 0.36–3.74)
WBC # BLD AUTO: 4.38 THOUSAND/UL (ref 4.31–10.16)

## 2021-04-24 PROCEDURE — 84443 ASSAY THYROID STIM HORMONE: CPT

## 2021-04-24 PROCEDURE — 80053 COMPREHEN METABOLIC PANEL: CPT

## 2021-04-24 PROCEDURE — 36415 COLL VENOUS BLD VENIPUNCTURE: CPT

## 2021-04-24 PROCEDURE — 85025 COMPLETE CBC W/AUTO DIFF WBC: CPT

## 2021-05-01 ENCOUNTER — IMMUNIZATIONS (OUTPATIENT)
Dept: FAMILY MEDICINE CLINIC | Facility: HOSPITAL | Age: 21
End: 2021-05-01

## 2021-05-01 DIAGNOSIS — Z23 ENCOUNTER FOR IMMUNIZATION: Primary | ICD-10-CM

## 2021-05-01 PROCEDURE — 0002A SARS-COV-2 / COVID-19 MRNA VACCINE (PFIZER-BIONTECH) 30 MCG: CPT

## 2021-05-01 PROCEDURE — 91300 SARS-COV-2 / COVID-19 MRNA VACCINE (PFIZER-BIONTECH) 30 MCG: CPT

## 2021-08-05 ENCOUNTER — TELEPHONE (OUTPATIENT)
Dept: FAMILY MEDICINE CLINIC | Facility: CLINIC | Age: 21
End: 2021-08-05

## 2021-08-05 DIAGNOSIS — Z20.822 EXPOSURE TO COVID-19 VIRUS: ICD-10-CM

## 2021-08-05 DIAGNOSIS — Z11.52 ENCOUNTER FOR SCREENING FOR COVID-19: Primary | ICD-10-CM

## 2021-08-05 PROCEDURE — U0003 INFECTIOUS AGENT DETECTION BY NUCLEIC ACID (DNA OR RNA); SEVERE ACUTE RESPIRATORY SYNDROME CORONAVIRUS 2 (SARS-COV-2) (CORONAVIRUS DISEASE [COVID-19]), AMPLIFIED PROBE TECHNIQUE, MAKING USE OF HIGH THROUGHPUT TECHNOLOGIES AS DESCRIBED BY CMS-2020-01-R: HCPCS | Performed by: FAMILY MEDICINE

## 2021-08-05 PROCEDURE — U0005 INFEC AGEN DETEC AMPLI PROBE: HCPCS | Performed by: FAMILY MEDICINE

## 2021-08-05 NOTE — TELEPHONE ENCOUNTER
Pt was around her aunt  and she has tested positive yesterday but had symptoms started Sunday they had exposure to them  She would like an order to be put in Epic to be able to go get tested during the 3-5 day window gap   Please let pt know when order in the system

## 2021-11-16 ENCOUNTER — TELEPHONE (OUTPATIENT)
Dept: FAMILY MEDICINE CLINIC | Facility: CLINIC | Age: 21
End: 2021-11-16

## 2021-11-17 ENCOUNTER — IMMUNIZATIONS (OUTPATIENT)
Dept: FAMILY MEDICINE CLINIC | Facility: CLINIC | Age: 21
End: 2021-11-17
Payer: COMMERCIAL

## 2021-11-17 DIAGNOSIS — Z23 ENCOUNTER FOR IMMUNIZATION: Primary | ICD-10-CM

## 2021-11-17 PROCEDURE — 90686 IIV4 VACC NO PRSV 0.5 ML IM: CPT

## 2021-11-17 PROCEDURE — 90471 IMMUNIZATION ADMIN: CPT

## 2022-01-03 ENCOUNTER — OFFICE VISIT (OUTPATIENT)
Dept: FAMILY MEDICINE CLINIC | Facility: CLINIC | Age: 22
End: 2022-01-03
Payer: COMMERCIAL

## 2022-01-03 ENCOUNTER — TELEPHONE (OUTPATIENT)
Dept: FAMILY MEDICINE CLINIC | Facility: CLINIC | Age: 22
End: 2022-01-03

## 2022-01-03 VITALS
OXYGEN SATURATION: 100 % | BODY MASS INDEX: 20.24 KG/M2 | TEMPERATURE: 98.2 F | HEART RATE: 88 BPM | SYSTOLIC BLOOD PRESSURE: 84 MMHG | RESPIRATION RATE: 16 BRPM | HEIGHT: 62 IN | DIASTOLIC BLOOD PRESSURE: 46 MMHG | WEIGHT: 110 LBS

## 2022-01-03 DIAGNOSIS — J02.9 PHARYNGITIS, UNSPECIFIED ETIOLOGY: Primary | ICD-10-CM

## 2022-01-03 PROCEDURE — 87147 CULTURE TYPE IMMUNOLOGIC: CPT | Performed by: FAMILY MEDICINE

## 2022-01-03 PROCEDURE — 99213 OFFICE O/P EST LOW 20 MIN: CPT | Performed by: FAMILY MEDICINE

## 2022-01-03 PROCEDURE — 87070 CULTURE OTHR SPECIMN AEROBIC: CPT | Performed by: FAMILY MEDICINE

## 2022-01-03 RX ORDER — AZITHROMYCIN 250 MG/1
TABLET, FILM COATED ORAL
Qty: 6 TABLET | Refills: 0 | Status: SHIPPED | OUTPATIENT
Start: 2022-01-03 | End: 2022-01-07

## 2022-01-03 NOTE — PROGRESS NOTES
Chief Complaint   Patient presents with    Throat     White spots in right side of throat   Mouth Lesions     Sores inside mouth   Shortness of Breath     Health Maintenance   Topic Date Due    Hepatitis C Screening  Never done    Pneumococcal Vaccine: Pediatrics (0 to 5 Years) and At-Risk Patients (6 to 59 Years) (1 of 2 - PPSV23) 11/07/2005    HIV Screening  Never done    Cervical Cancer Screening  Never done    DTaP,Tdap,and Td Vaccines (7 - Td or Tdap) 06/28/2021    COVID-19 Vaccine (3 - Booster for Microbonds series) 11/01/2021    Annual Physical  03/02/2022    BMI: Adult  01/03/2023    HIB Vaccine  Completed    Hepatitis B Vaccine  Completed    Hepatitis A Vaccine  Completed    Influenza Vaccine  Completed    HPV Vaccine  Completed    IPV Vaccine  Aged Out    Meningococcal ACWY Vaccine  Aged Out           Assessment/Plan: We are out of rapid strep test today  Did throat culture  A lot of fluids and rest  Tylenol or motrin for fever  Give zpack per pt request  Only use if culture positive  Pt understood  Call office if symptoms no improving or worse  Diagnoses and all orders for this visit:    Pharyngitis, unspecified etiology  -     azithromycin (ZITHROMAX) 250 mg tablet; Take 2 tabs on day 1, then take 1 tab daily from day 2-day 5   -     Throat culture          Subjective:      Patient ID: Fritz Bennett is a 24 y o  female  HPI    Pt is here by herself  Sore throat for 3 days  White spot per pt  No fever  Little cough  No phelgm or wheezing  Denies n/v/diarrhea  Asthma controlled  Use xopenex prn  No smoking  The following portions of the patient's history were reviewed and updated as appropriate: allergies, current medications, past family history, past medical history, past social history, past surgical history and problem list     Review of Systems   Constitutional: Negative for appetite change, chills and fever  HENT: Positive for sore throat  Negative for congestion, ear pain and sinus pain  Eyes: Negative for discharge and itching  Respiratory: Positive for cough  Negative for apnea, chest tightness, shortness of breath and wheezing  Cardiovascular: Negative for chest pain, palpitations and leg swelling  Gastrointestinal: Negative for abdominal pain, anal bleeding, constipation, diarrhea, nausea and vomiting  Endocrine: Negative for cold intolerance, heat intolerance and polyuria  Genitourinary: Negative for difficulty urinating and dysuria  Musculoskeletal: Negative for arthralgias, back pain and myalgias  Skin: Negative for rash  Neurological: Negative for dizziness and headaches  Psychiatric/Behavioral: Negative for agitation  Objective:      BP (!) 84/46 (BP Location: Left arm, Patient Position: Sitting, Cuff Size: Adult)   Pulse 88   Temp 98 2 °F (36 8 °C) (Tympanic)   Resp 16   Ht 5' 2" (1 575 m)   Wt 49 9 kg (110 lb)   SpO2 100%   BMI 20 12 kg/m²          Physical Exam  Constitutional:       General: She is not in acute distress  Appearance: She is well-developed  HENT:      Head: Normocephalic  Right Ear: External ear normal       Left Ear: External ear normal       Nose: Nose normal       Mouth/Throat:      Pharynx: No oropharyngeal exudate  Comments: Right Tonsil swollen 1+  Eyes:      General:         Right eye: No discharge  Left eye: No discharge  Conjunctiva/sclera: Conjunctivae normal       Pupils: Pupils are equal, round, and reactive to light  Neck:      Thyroid: No thyromegaly  Cardiovascular:      Rate and Rhythm: Normal rate and regular rhythm  Heart sounds: Normal heart sounds  No murmur heard  No friction rub  No gallop  Pulmonary:      Effort: Pulmonary effort is normal  No respiratory distress  Breath sounds: Normal breath sounds  No wheezing or rales  Chest:      Chest wall: No tenderness     Abdominal:      General: Bowel sounds are normal  There is no distension  Palpations: Abdomen is soft  There is no mass  Tenderness: There is no abdominal tenderness  There is no guarding or rebound  Musculoskeletal:         General: No tenderness or deformity  Normal range of motion  Cervical back: Normal range of motion  Lymphadenopathy:      Cervical: No cervical adenopathy  Neurological:      Mental Status: She is alert

## 2022-01-03 NOTE — TELEPHONE ENCOUNTER
Pt has white spot and bubbles on her throat  Can't swallow  Pt had step throat a few months ago and seems it's repeating  Would like to see if antibiotic could be sent to pharmacy

## 2022-01-06 LAB — BACTERIA THROAT CULT: ABNORMAL

## 2022-01-15 ENCOUNTER — OFFICE VISIT (OUTPATIENT)
Dept: URGENT CARE | Age: 22
End: 2022-01-15
Payer: COMMERCIAL

## 2022-01-15 VITALS — TEMPERATURE: 96.6 F | HEART RATE: 86 BPM | OXYGEN SATURATION: 99 %

## 2022-01-15 DIAGNOSIS — J02.0 STREP PHARYNGITIS: Primary | ICD-10-CM

## 2022-01-15 PROCEDURE — 99213 OFFICE O/P EST LOW 20 MIN: CPT | Performed by: STUDENT IN AN ORGANIZED HEALTH CARE EDUCATION/TRAINING PROGRAM

## 2022-01-15 RX ORDER — CLINDAMYCIN HYDROCHLORIDE 300 MG/1
300 CAPSULE ORAL 3 TIMES DAILY
Qty: 21 CAPSULE | Refills: 0 | Status: SHIPPED | OUTPATIENT
Start: 2022-01-15 | End: 2022-01-22

## 2022-01-15 RX ORDER — AMOXICILLIN 500 MG/1
500 CAPSULE ORAL EVERY 12 HOURS SCHEDULED
Qty: 14 CAPSULE | Refills: 0 | Status: SHIPPED | OUTPATIENT
Start: 2022-01-15 | End: 2022-01-15 | Stop reason: CLARIF

## 2022-01-15 NOTE — PROGRESS NOTES
[facility]        NAME: Uzma Nichols is a 24 y o  female  : 2000    MRN: [id number]  DATE: January 15, 2022  TIME: 5:46 PM    Assessment and Plan   Strep pharyngitis [J02 0]  1  Strep pharyngitis  clindamycin (CLEOCIN) 300 MG capsule    DISCONTINUED: amoxicillin (AMOXIL) 500 mg capsule     1  Acute Streptococcal Pharyngitis  - amox 7 days prescribed, complete as directed  - take Tylenol or Motrin as needed   - drink plenty of fluids   - advised warm salt water gargles and throat lozenges as needed   - follow up w/ PCP for re-check in 3-5 days  - if symptoms persist despite treatment, worsen, or any new symptoms present, should be seen in the ER       Patient Instructions       Follow up with PCP in 3-5 days  Proceed to  ER if symptoms worsen  Chief Complaint     Chief Complaint   Patient presents with    Throat     bumps on back of her throat         History of Present Illness       HPI   Patient is diagnosed with strep on  treated with zpack  She states that she continues to have sore throat and white spots over her throat fevers very minimal cough no phlegm  She does not smoker      Review of Systems   Review of Systems  Per hpi     Current Medications       Current Outpatient Medications:     clindamycin (CLEOCIN) 300 MG capsule, Take 1 capsule (300 mg total) by mouth 3 (three) times a day for 7 days, Disp: 21 capsule, Rfl: 0    EPINEPHrine (EPIPEN 2-JUANI) 0 3 mg/0 3 mL SOAJ, Inject 0 3 mL as directed (Patient not taking: Reported on 1/15/2022 ), Disp: , Rfl:     levalbuterol (XOPENEX) 1 25 mg/3 mL nebulizer solution, Inhale 1 each (Patient not taking: Reported on 1/15/2022 ), Disp: , Rfl:     Current Allergies     Allergies as of 01/15/2022 - Reviewed 01/15/2022   Allergen Reaction Noted    Eggs or egg-derived products - food allergy Hives 2017    Other Hives, Itching, and Other (See Comments) 2017    Seasonal ic  [cholestatin]  2014            The following portions of the patient's history were reviewed and updated as appropriate: allergies, current medications, past family history, past medical history, past social history, past surgical history and problem list      Past Medical History:   Diagnosis Date    Abdominal pain     Allergic     Asthma     Asthma     Chronic abdominal pain     Resolved: 12/07/2017    Chronic diarrhea     Resolved: 07/18/2017    Congenital hemihypertrophy     Early satiety     Exotropia     Gastric mucosal hypertrophy     Leg length discrepancy     Nausea     Occasional tremors     Orthostasis     Pallor     Resolved: 01/16/2017    Poor appetite     RAD (reactive airway disease)     Syncope     Resolved: 01/16/2017    Weight loss        Past Surgical History:   Procedure Laterality Date    ESOPHAGOGASTRODUODENOSCOPY      with biopsy    FOOT SURGERY      MULTIPLE TOOTH EXTRACTIONS N/A 1/30/2017    Procedure: COMPLETE BONY IMPACTED EXTRACTION TEETH # 1, 16, 17, 32;  Surgeon: Janice Whiteside DMD;  [facility]: [facility];  Service:     OVARIAN CYST REMOVAL      VT EGD TRANSORAL BIOPSY SINGLE/MULTIPLE N/A 9/26/2017    Procedure: ESOPHAGOGASTRODUODENOSCOPY (EGD); Surgeon: Rashaad Cuevas MD;  Location: [facility];   Service: Pediatric Gastrointestinal       Family History   Problem Relation Age of Onset    No Known Problems Mother     No Known Problems Father     Asthma Sister     Diabetes Maternal Grandmother     Kidney cancer Family     Asthma Family     Heart disease Family     Asthma Other     Kidney cancer Family     Other Family         Cardiac disorder    Asthma Brother     Cancer Paternal Grandfather     No Known Problems Maternal Grandfather     No Known Problems Paternal Grandmother     No Known Problems Maternal Aunt     No Known Problems Maternal Aunt     No Known Problems Maternal Aunt     No Known Problems Maternal Aunt     No Known Problems Paternal Aunt     Joann Ames / Maggi Neg Hx     Substance Abuse Neg Hx          Medications have been verified  Objective   Pulse 86   Temp (!) 96 6 °F (35 9 °C) (Temporal)   SpO2 99%   No LMP recorded  Physical Exam     Physical Exam  Constitutional:       General: She is not in acute distress  Appearance: Normal appearance  HENT:      Head: Normocephalic  Nose: No congestion or rhinorrhea  Mouth/Throat:      Mouth: Mucous membranes are moist       Pharynx: Oropharyngeal exudate and posterior oropharyngeal erythema present  Eyes:      General:         Right eye: No discharge  Left eye: No discharge  Conjunctiva/sclera: Conjunctivae normal    Cardiovascular:      Rate and Rhythm: Normal rate and regular rhythm  Pulses: Normal pulses  Pulmonary:      Effort: Pulmonary effort is normal  No respiratory distress  Abdominal:      General: Abdomen is flat  There is no distension  Palpations: Abdomen is soft  Tenderness: There is no abdominal tenderness  Musculoskeletal:      Cervical back: Neck supple  Skin:     General: Skin is warm  Capillary Refill: Capillary refill takes less than 2 seconds  Neurological:      Mental Status: She is alert and oriented to person, place, and time

## 2022-10-14 ENCOUNTER — OFFICE VISIT (OUTPATIENT)
Dept: FAMILY MEDICINE CLINIC | Facility: CLINIC | Age: 22
End: 2022-10-14
Payer: COMMERCIAL

## 2022-10-14 VITALS
SYSTOLIC BLOOD PRESSURE: 92 MMHG | DIASTOLIC BLOOD PRESSURE: 48 MMHG | HEART RATE: 96 BPM | BODY MASS INDEX: 20.54 KG/M2 | HEIGHT: 62 IN | WEIGHT: 111.6 LBS | TEMPERATURE: 98.3 F | RESPIRATION RATE: 16 BRPM | OXYGEN SATURATION: 98 %

## 2022-10-14 DIAGNOSIS — J45.20 MILD INTERMITTENT ASTHMA WITHOUT COMPLICATION: ICD-10-CM

## 2022-10-14 DIAGNOSIS — Z23 ENCOUNTER FOR IMMUNIZATION: ICD-10-CM

## 2022-10-14 DIAGNOSIS — Z00.00 ANNUAL PHYSICAL EXAM: Primary | ICD-10-CM

## 2022-10-14 PROCEDURE — 90682 RIV4 VACC RECOMBINANT DNA IM: CPT

## 2022-10-14 PROCEDURE — 90471 IMMUNIZATION ADMIN: CPT

## 2022-10-14 PROCEDURE — 90715 TDAP VACCINE 7 YRS/> IM: CPT

## 2022-10-14 PROCEDURE — 90472 IMMUNIZATION ADMIN EACH ADD: CPT

## 2022-10-14 PROCEDURE — 99395 PREV VISIT EST AGE 18-39: CPT | Performed by: FAMILY MEDICINE

## 2022-10-14 NOTE — PROGRESS NOTES
[facility]ACT[facility]    NAME: Kasie Palacios  AGE: 24 y o  SEX: female  : 2000     DATE: 10/14/2022     Assessment and Plan:     Problem List Items Addressed This Visit        Respiratory    Mild intermittent asthma without complication     Controlled  Use xopenex prn  Other Visit Diagnoses     Annual physical exam    -  Primary    Encounter for immunization        Relevant Orders    influenza vaccine, quadrivalent, recombinant, PF, 0 5 mL, for patients 18 yr+ (FLUBLOK)        Pt will bring 2 pages of learner's permit form here to sign  Give flu shot today  Will think about PCV20  Give information sheet  Will check insurance for coverage of Tdap  FU OBGYN for pap  RTO in 1 year or sooner prn  Immunizations and preventive care screenings were discussed with patient today  Appropriate education was printed on patient's after visit summary  Counseling:  Alcohol/drug use: discussed moderation in alcohol intake, the recommendations for healthy alcohol use, and avoidance of illicit drug use  Dental Health: discussed importance of regular tooth brushing, flossing, and dental visits  Injury prevention: discussed safety/seat belts, safety helmets, smoke detectors, carbon dioxide detectors, and smoking near bedding or upholstery  Sexual health: discussed sexually transmitted diseases, partner selection, use of condoms, avoidance of unintended pregnancy, and contraceptive alternatives  · Exercise: the importance of regular exercise/physical activity was discussed  Recommend exercise 3-5 times per week for at least 30 minutes  Return in about 1 year (around 10/14/2023) for Annual physical      Chief Complaint:     Chief Complaint   Patient presents with   • Physical Exam     Annual preventative       Health Maintenance   Topic Date Due   • Hepatitis C Screening  Never done   • Pneumococcal Vaccine: Pediatrics (0 to 5 Years) and At-Risk Patients (6 to 59 Years) (1 - PPSV23 or PCV20) 2002   • HIV Screening  Never done   • Cervical Cancer Screening  Never done   • DTaP,Tdap,and Td Vaccines (7 - Td or Tdap) 2021   • COVID-19 Vaccine (3 - Booster for Pfizer series) 10/01/2021   • Annual Physical  2022   • Influenza Vaccine (1) 2022   • BMI: Adult  10/14/2023   • HIB Vaccine  Completed   • Hepatitis B Vaccine  Completed   • Hepatitis A Vaccine  Completed   • HPV Vaccine  Completed   • IPV Vaccine  Aged Out   • Meningococcal ACWY Vaccine  Aged Out      History of Present Illness:     Adult Annual Physical   Patient here for a comprehensive physical exam  The patient reports no problems  Asthma----Controlled  Flare up due to dust allergies etc  Use xopenex rarely  No smoking, alcohol or drugs  Denies depression  Diet and Physical Activity  · Diet/Nutrition: well balanced diet  · Exercise: no formal exercise  Depression Screening  PHQ-2/9 Depression Screening    Little interest or pleasure in doing things: 0 - not at all  Feeling down, depressed, or hopeless: 0 - not at all  Trouble falling or staying asleep, or sleeping too much: 0 - not at all  Feeling tired or having little energy: 0 - not at all  Poor appetite or overeatin - not at all  Feeling bad about yourself - or that you are a failure or have let yourself or your family down: 0 - not at all  Trouble concentrating on things, such as reading the newspaper or watching television: 0 - not at all  Moving or speaking so slowly that other people could have noticed  Or the opposite - being so fidgety or restless that you have been moving around a lot more than usual: 0 - not at all  Thoughts that you would be better off dead, or of hurting yourself in some way: 0 - not at all  PHQ-9 Score: 0   PHQ-9 Interpretation: No or Minimal depression        General Health  · Sleep: sleeps well  · Hearing: tinnitis   FU ENT  · Vision: wears glasses and FU eye doc  · Dental: regular dental visits  /GYN Health  · Last menstrual period: 10/10/2022, regular  · FU OBGYN on 10/18/2022  Review of Systems:     Review of Systems   Constitutional: Negative for appetite change, chills and fever  HENT: Negative for congestion, ear pain, sinus pain and sore throat  Eyes: Negative for discharge and itching  Respiratory: Negative for apnea, cough, chest tightness, shortness of breath and wheezing  Cardiovascular: Negative for chest pain, palpitations and leg swelling  Gastrointestinal: Negative for abdominal pain, anal bleeding, constipation, diarrhea, nausea and vomiting  Endocrine: Negative for cold intolerance, heat intolerance and polyuria  Genitourinary: Negative for difficulty urinating and dysuria  Musculoskeletal: Negative for arthralgias, back pain and myalgias  Skin: Negative for rash  Neurological: Negative for dizziness and headaches  Psychiatric/Behavioral: Negative for agitation        Past Medical History:     Past Medical History:   Diagnosis Date   • Abdominal pain    • Allergic    • Asthma    • Asthma    • Chronic abdominal pain     Resolved: 12/07/2017   • Chronic diarrhea     Resolved: 07/18/2017   • Congenital hemihypertrophy    • Early satiety    • Exotropia    • Gastric mucosal hypertrophy    • Leg length discrepancy    • Nausea    • Occasional tremors    • Orthostasis    • Pallor     Resolved: 01/16/2017   • Poor appetite    • RAD (reactive airway disease)    • Syncope     Resolved: 01/16/2017   • Weight loss       Past Surgical History:     Past Surgical History:   Procedure Laterality Date   • ESOPHAGOGASTRODUODENOSCOPY      with biopsy   • FOOT SURGERY     • MULTIPLE TOOTH EXTRACTIONS N/A 1/30/2017    Procedure: COMPLETE BONY IMPACTED EXTRACTION TEETH # 1, 16, 17, 32;  Surgeon: Chris Russo DMD;  [facility];  Service:    • OVARIAN CYST REMOVAL     • OR EGD TRANSORAL BIOPSY SINGLE/MULTIPLE N/A 9/26/2017    Procedure: ESOPHAGOGASTRODUODENOSCOPY (EGD); Surgeon: Mohinder Pena MD;  [facility];   Service: Pediatric Gastrointestinal      Social History:     Social History     Socioeconomic History   • Marital status: Single     Spouse name: None   • Number of children: None   • Years of education: None   • Highest education level: None   Occupational History   • Occupation: Full time student   Tobacco Use   • Smoking status: Never Smoker   • Smokeless tobacco: Never Used   Vaping Use   • Vaping Use: Never used   Substance and Sexual Activity   • Alcohol use: No   • Drug use: No   • Sexual activity: Never   Other Topics Concern   • None   Social History Narrative    Brushes teeth twice a day    Lives with parents    No tobacco/smoke exposure    Pets/animals: Dog    Sleeps 8 - 10 hours a day     Social Determinants of Health     Financial Resource Strain: Not on file   Food Insecurity: Not on file   Transportation Needs: Not on file   Physical Activity: Not on file   Stress: Not on file   Social Connections: Not on file   Intimate Partner Violence: Not on file   Housing Stability: Not on file      Family History:     Family History   Problem Relation Age of Onset   • No Known Problems Mother    • No Known Problems Father    • Asthma Sister    • Diabetes Maternal Grandmother    • Kidney cancer Family    • Asthma Family    • Heart disease Family    • Asthma Other    • Kidney cancer Family    • Other Family         Cardiac disorder   • Asthma Brother    • Cancer Paternal Grandfather    • No Known Problems Maternal Grandfather    • No Known Problems Paternal Grandmother    • No Known Problems Maternal Aunt    • No Known Problems Maternal Aunt    • No Known Problems Maternal Aunt    • No Known Problems Maternal Aunt    • No Known Problems Paternal Aunt    • Miscarriages / Stillbirths Neg Hx    • Substance Abuse Neg Hx       Current Medications:     Current Outpatient Medications Medication Sig Dispense Refill   • EPINEPHrine (EPIPEN 2-JUANI) 0 3 mg/0 3 mL SOAJ Inject 0 3 mL as directed (Patient not taking: Reported on 1/15/2022 )     • levalbuterol (XOPENEX) 1 25 mg/3 mL nebulizer solution Inhale 1 each (Patient not taking: Reported on 1/15/2022 )     • pantoprazole (PROTONIX) 40 mg tablet Take 1 tablet (40 mg total) by mouth daily 30 tablet 3     No current facility-administered medications for this visit  Allergies: Allergies   Allergen Reactions   • Eggs Or Egg-Derived Products - Food Allergy Hives   • Other Hives, Itching and Other (See Comments)     Cold water/temperatures  Pt has passed out from the cold  "cold urticaria"   • Seasonal Ic  [Cholestatin]       Physical Exam:     BP (!) 92/48 (BP Location: Left arm, Patient Position: Sitting, Cuff Size: Adult)   Pulse 96   Temp 98 3 °F (36 8 °C) (Tympanic)   Resp 16   Ht 5' 2 25" (1 581 m)   Wt 50 6 kg (111 lb 9 6 oz)   SpO2 98%   BMI 20 25 kg/m²     Physical Exam  Vitals reviewed  Constitutional:       Appearance: Normal appearance  HENT:      Head: Normocephalic and atraumatic  Eyes:      General:         Right eye: No discharge  Left eye: No discharge  Conjunctiva/sclera: Conjunctivae normal    Neck:      Vascular: No carotid bruit  Cardiovascular:      Rate and Rhythm: Normal rate and regular rhythm  Heart sounds: Normal heart sounds  No murmur heard  No friction rub  No gallop  Pulmonary:      Effort: Pulmonary effort is normal  No respiratory distress  Breath sounds: Normal breath sounds  No wheezing or rales  Abdominal:      General: Bowel sounds are normal  There is no distension  Palpations: Abdomen is soft  Tenderness: There is no abdominal tenderness  Musculoskeletal:         General: No swelling, tenderness or deformity  Normal range of motion  Cervical back: Normal range of motion and neck supple  No muscular tenderness     Lymphadenopathy:      Cervical: No cervical adenopathy  Neurological:      Mental Status: She is alert     Psychiatric:         Mood and Affect: Mood normal           Warner Manuel MD   [address] [place] [address]

## 2022-10-18 ENCOUNTER — OFFICE VISIT (OUTPATIENT)
Dept: OBGYN CLINIC | Facility: CLINIC | Age: 22
End: 2022-10-18
Payer: COMMERCIAL

## 2022-10-18 VITALS
HEIGHT: 62 IN | BODY MASS INDEX: 20.43 KG/M2 | WEIGHT: 111 LBS | DIASTOLIC BLOOD PRESSURE: 52 MMHG | SYSTOLIC BLOOD PRESSURE: 89 MMHG

## 2022-10-18 DIAGNOSIS — Z01.419 WELL WOMAN EXAM WITH ROUTINE GYNECOLOGICAL EXAM: Primary | ICD-10-CM

## 2022-10-18 DIAGNOSIS — Z12.4 ENCOUNTER FOR SCREENING FOR MALIGNANT NEOPLASM OF CERVIX: ICD-10-CM

## 2022-10-18 PROCEDURE — 99385 PREV VISIT NEW AGE 18-39: CPT | Performed by: PHYSICIAN ASSISTANT

## 2022-10-18 PROCEDURE — G0145 SCR C/V CYTO,THINLAYER,RESCR: HCPCS | Performed by: OBSTETRICS & GYNECOLOGY

## 2022-10-18 NOTE — PROGRESS NOTES
ASSESSMENT & PLAN:   Diagnoses and all orders for this visit:    Well woman exam with routine gynecological exam  -     Liquid-based pap, screening    Encounter for screening for malignant neoplasm of cervix  -     Liquid-based pap, screening        Reviewed use of dilators, tampons with KY jelly to relax vaginal muscles due to narrow vagina  The following were reviewed in today's visit: ASCCP guidelines, Gardisil vaccination, STD testing breast self exam, STD testing, exercise and healthy diet  Patient to return to office in yearly for annual exam      All questions have been answered to her satisfaction  CC:  Annual Gynecologic Examination  Chief Complaint   Patient presents with   • New Patient Visit     NP here today for her annual exm       HPI: Bennett Chavez is a 24 y o  Almon Monk who presents for annual gynecologic examination    She has the following concerns:  None at this time      Health Maintenance:    Exercise: rarely  Breast exams/breast awareness: yes  Diet: well balanced diet      Past Medical History:   Diagnosis Date   • Abdominal pain    • Allergic    • Asthma    • Asthma    • Chronic abdominal pain     Resolved: 12/07/2017   • Chronic diarrhea     Resolved: 07/18/2017   • Congenital hemihypertrophy    • Early satiety    • Exotropia    • Gastric mucosal hypertrophy    • Leg length discrepancy    • Nausea    • Occasional tremors    • Orthostasis    • Pallor     Resolved: 01/16/2017   • Poor appetite    • RAD (reactive airway disease)    • Syncope     Resolved: 01/16/2017   • Weight loss        Past Surgical History:   Procedure Laterality Date   • ESOPHAGOGASTRODUODENOSCOPY      with biopsy   • FOOT SURGERY     • MULTIPLE TOOTH EXTRACTIONS N/A 1/30/2017    Procedure: COMPLETE BONY IMPACTED EXTRACTION TEETH # 1, 16, 17, 32;  Surgeon: Benedict Dandy, DMD;  [facility];  Service:    • OVARIAN CYST REMOVAL     • DE EGD TRANSORAL BIOPSY SINGLE/MULTIPLE N/A 9/26/2017 Procedure: ESOPHAGOGASTRODUODENOSCOPY (EGD); Surgeon: Edel Rivero MD;  Location: [facility]; Service: Pediatric Gastrointestinal       Past OB/Gyn History:  Period Cycle (Days): 28  Period Duration (Days): 6  Period Pattern: Regular  Menstrual Flow: Heavy, Light  Menstrual Control: Maxi padPatient's last menstrual period was 10/06/2022 (exact date)  Last Pap: no prior :  History of abnormal Pap smear: no  HPV vaccine completed: YES    Patient is not currently sexually active     STD testing: no  Current contraception: none      Family History  Family History   Problem Relation Age of Onset   • No Known Problems Mother    • No Known Problems Father    • Asthma Sister    • Diabetes Maternal Grandmother    • Kidney cancer Family    • Asthma Family    • Heart disease Family    • Asthma Other    • Kidney cancer Family    • Other Family         Cardiac disorder   • Asthma Brother    • Cancer Paternal Grandfather    • No Known Problems Maternal Grandfather    • No Known Problems Paternal Grandmother    • No Known Problems Maternal Aunt    • No Known Problems Maternal Aunt    • No Known Problems Maternal Aunt    • No Known Problems Maternal Aunt    • No Known Problems Paternal Aunt    • Miscarriages / Stillbirths Neg Hx    • Substance Abuse Neg Hx        Family history of uterine or ovarian cancer: no  Family history of breast cancer: no  Family history of colon cancer: no    Social History:  Social History     Socioeconomic History   • Marital status: Single     Spouse name: Not on file   • Number of children: Not on file   • Years of education: Not on file   • Highest education level: Not on file   Occupational History   • Occupation: Full time student   Tobacco Use   • Smoking status: Never Smoker   • Smokeless tobacco: Never Used   Vaping Use   • Vaping Use: Never used   Substance and Sexual Activity   • Alcohol use: No   • Drug use: No   • Sexual activity: Never   Other Topics Concern   • Not on file   Social History Narrative    Brushes teeth twice a day    Lives with parents    No tobacco/smoke exposure    Pets/animals: Dog    Sleeps 8 - 10 hours a day     Social Determinants of Health     Financial Resource Strain: Not on file   Food Insecurity: Not on file   Transportation Needs: Not on file   Physical Activity: Not on file   Stress: Not on file   Social Connections: Not on file   Intimate Partner Violence: Not on file   Housing Stability: Not on file     Domestic violence screen: negative    Allergies: Allergies   Allergen Reactions   • Eggs Or Egg-Derived Products - Food Allergy Hives   • Other Hives, Itching and Other (See Comments)     Cold water/temperatures  Pt has passed out from the cold  "cold urticaria"   • Seasonal Ic  [Cholestatin]        Medications:    Current Outpatient Medications:   •  pantoprazole (PROTONIX) 40 mg tablet, Take 1 tablet (40 mg total) by mouth daily, Disp: 30 tablet, Rfl: 3    Review of Systems:  Review of Systems   Constitutional: Negative for chills, fever and unexpected weight change  Respiratory: Negative for shortness of breath  Cardiovascular: Negative for chest pain  Gastrointestinal: Negative for abdominal pain, diarrhea, nausea and vomiting  Skin: Negative for rash  Psychiatric/Behavioral: Negative for dysphoric mood  The patient is not nervous/anxious  Physical Exam:  BP (!) 89/52 (BP Location: Right arm, Patient Position: Sitting, Cuff Size: Standard)   Ht 5' 2 25" (1 581 m)   Wt 50 3 kg (111 lb)   LMP 10/06/2022 (Exact Date)   Breastfeeding No   BMI 20 14 kg/m²    Physical Exam  Constitutional:       Appearance: Normal appearance  Genitourinary:      Vulva and urethral meatus normal       No lesions in the vagina  Right Labia: No rash or lesions  Left Labia: No lesions or rash  No vaginal discharge, erythema or bleeding  Right Adnexa: not tender and no mass present  Left Adnexa: not tender and no mass present       No cervical discharge or lesion  Uterus is not tender  Breasts: Breasts are symmetrical       Right: No mass, skin change or axillary adenopathy  Left: No mass, skin change or axillary adenopathy  HENT:      Head: Normocephalic and atraumatic  Cardiovascular:      Rate and Rhythm: Normal rate and regular rhythm  Heart sounds: Normal heart sounds  No murmur heard  No friction rub  No gallop  Pulmonary:      Effort: Pulmonary effort is normal       Breath sounds: Normal breath sounds  No wheezing, rhonchi or rales  Abdominal:      General: Abdomen is flat  There is no distension  Palpations: Abdomen is soft  Tenderness: There is no abdominal tenderness  Musculoskeletal:      Cervical back: Neck supple  Lymphadenopathy:      Upper Body:      Right upper body: No axillary adenopathy  Left upper body: No axillary adenopathy  Neurological:      General: No focal deficit present  Mental Status: She is alert  Skin:     General: Skin is warm and dry  Psychiatric:         Mood and Affect: Mood normal          Behavior: Behavior normal    Vitals reviewed

## 2022-10-19 ENCOUNTER — TELEPHONE (OUTPATIENT)
Dept: FAMILY MEDICINE CLINIC | Facility: CLINIC | Age: 22
End: 2022-10-19

## 2022-10-19 NOTE — TELEPHONE ENCOUNTER
Patient ([phone]) dropped off form for learners permit to be completed by provider  Forms placed in provider's bin

## 2022-10-24 LAB
LAB AP GYN PRIMARY INTERPRETATION: NORMAL
Lab: NORMAL

## 2022-10-26 ENCOUNTER — ESTABLISHED COMPREHENSIVE EXAM (OUTPATIENT)
Dept: URBAN - METROPOLITAN AREA CLINIC 6 | Facility: CLINIC | Age: 22
End: 2022-10-26

## 2022-10-26 DIAGNOSIS — Z01.00: ICD-10-CM

## 2022-10-26 PROCEDURE — 92015 DETERMINE REFRACTIVE STATE: CPT

## 2022-10-26 PROCEDURE — 92014 COMPRE OPH EXAM EST PT 1/>: CPT

## 2022-10-26 ASSESSMENT — VISUAL ACUITY
OD_CC: (L)20/25-1
OS_CC: (L)20/20

## 2023-01-11 ENCOUNTER — TELEPHONE (OUTPATIENT)
Dept: PSYCHIATRY | Facility: CLINIC | Age: 23
End: 2023-01-11

## 2023-01-11 NOTE — TELEPHONE ENCOUNTER
Writer contacted pt in regards to a vm we received requesting a call back to schedule an appt  She stated that she wants to schedule an appt with [facility]   Writer informed pt that there are no openings available at this moment and she agreed to be place on wait list     Health insurance: [employer]

## 2023-10-17 ENCOUNTER — OFFICE VISIT (OUTPATIENT)
Dept: FAMILY MEDICINE CLINIC | Facility: CLINIC | Age: 23
End: 2023-10-17
Payer: COMMERCIAL

## 2023-10-17 VITALS
SYSTOLIC BLOOD PRESSURE: 92 MMHG | HEIGHT: 62 IN | RESPIRATION RATE: 16 BRPM | BODY MASS INDEX: 20.02 KG/M2 | WEIGHT: 108.8 LBS | TEMPERATURE: 98.2 F | DIASTOLIC BLOOD PRESSURE: 64 MMHG | OXYGEN SATURATION: 98 % | HEART RATE: 84 BPM

## 2023-10-17 DIAGNOSIS — Z23 ENCOUNTER FOR IMMUNIZATION: ICD-10-CM

## 2023-10-17 DIAGNOSIS — G62.9 NEUROPATHY: ICD-10-CM

## 2023-10-17 DIAGNOSIS — Z00.00 ANNUAL PHYSICAL EXAM: Primary | ICD-10-CM

## 2023-10-17 PROBLEM — F32.A DEPRESSION: Status: RESOLVED | Noted: 2020-08-24 | Resolved: 2023-10-17

## 2023-10-17 PROCEDURE — 90472 IMMUNIZATION ADMIN EACH ADD: CPT

## 2023-10-17 PROCEDURE — 90677 PCV20 VACCINE IM: CPT

## 2023-10-17 PROCEDURE — 99395 PREV VISIT EST AGE 18-39: CPT | Performed by: FAMILY MEDICINE

## 2023-10-17 PROCEDURE — 90471 IMMUNIZATION ADMIN: CPT

## 2023-10-17 PROCEDURE — 90686 IIV4 VACC NO PRSV 0.5 ML IM: CPT

## 2023-10-17 NOTE — PROGRESS NOTES
[address] [facility],[address] [facility] PRACTICE    NAME: Tanner Number  AGE: 20 y.o. SEX: female  : 2000     DATE: 10/17/2023     Assessment and Plan:     Problem List Items Addressed This Visit          Nervous and Auditory    Neuropathy     Numbness of arms under warm water. Occasionally on legs. Will check vit B12/ folate and EMG. Relevant Orders    Vitamin B12    Folate    EMG 2 Limb Upper Extremity     Other Visit Diagnoses       Annual physical exam    -  Primary    Encounter for immunization        Relevant Orders    influenza vaccine, quadrivalent, 0.5 mL, preservative-free, for adult and pediatric patients 6 mos+ (AFLURIA, FLUARIX, FLULAVAL, FLUZONE) (Completed)    Pneumococcal Conjugate Vaccine 20-valent (Pcv20) (Completed)            Give flu shot today. Give PCV20 today. FU OBGYN for pap. 10/2022 negative. RTO in 1 year or sooner prn. Immunizations and preventive care screenings were discussed with patient today. Appropriate education was printed on patient's after visit summary. Counseling:  Alcohol/drug use: discussed moderation in alcohol intake, the recommendations for healthy alcohol use, and avoidance of illicit drug use. Dental Health: discussed importance of regular tooth brushing, flossing, and dental visits. Injury prevention: discussed safety/seat belts, safety helmets, smoke detectors, carbon dioxide detectors, and smoking near bedding or upholstery. Sexual health: discussed sexually transmitted diseases, partner selection, use of condoms, avoidance of unintended pregnancy, and contraceptive alternatives. Exercise: the importance of regular exercise/physical activity was discussed. Recommend exercise 3-5 times per week for at least 30 minutes.           Return in about 1 year (around 10/17/2024) for Annual physical.     Chief Complaint:     Chief Complaint   Patient presents with    Physical Exam     Annual History of Present Illness:     Adult Annual Physical   Patient here for a comprehensive physical exam. The patient reports problems - numbness . Arms feel numbness for 1 month. Come and go. Usually it happens under warm water. No rash. No pain. Occasionally it happens on legs. Asthma----Controlled. Flare up due to dust allergies etc. Use xopenex rarely. No smoking, alcohol or drugs. Denies depression. Diet and Physical Activity  Diet/Nutrition: well balanced diet. Exercise: no formal exercise. Depression Screening  PHQ-2/9 Depression Screening    Little interest or pleasure in doing things: 0 - not at all  Feeling down, depressed, or hopeless: 0 - not at all  PHQ-2 Score: 0  PHQ-2 Interpretation: Negative depression screen       General Health  Sleep: sleeps well. Hearing: normal - bilateral.  Vision: wears glasses. Dental: regular dental visits. /GYN Health  Last menstrual period: 10/10/2023, regular  Contraceptive method:  Not sexually active . Review of Systems:     Review of Systems   Constitutional:  Negative for appetite change, chills and fever. HENT:  Negative for congestion, ear pain, sinus pain and sore throat. Eyes:  Negative for discharge and itching. Respiratory:  Negative for apnea, cough, chest tightness, shortness of breath and wheezing. Cardiovascular:  Negative for chest pain, palpitations and leg swelling. Gastrointestinal:  Negative for abdominal pain, anal bleeding, constipation, diarrhea, nausea and vomiting. Endocrine: Negative for cold intolerance, heat intolerance and polyuria. Genitourinary:  Negative for difficulty urinating and dysuria. Musculoskeletal:  Negative for arthralgias, back pain and myalgias. Skin:  Negative for rash. Neurological:  Positive for numbness. Negative for dizziness and headaches. Psychiatric/Behavioral:  Negative for agitation.        Past Medical History:     Past Medical History:   Diagnosis Date    Abdominal pain     Allergic     Asthma     Asthma     Chronic abdominal pain     Resolved: 12/07/2017    Chronic diarrhea     Resolved: 07/18/2017    Congenital hemihypertrophy     Depression 08/24/2020    Early satiety     Exotropia     Gastric mucosal hypertrophy     Leg length discrepancy     Nausea     Occasional tremors     Orthostasis     Pallor     Resolved: 01/16/2017    Poor appetite     RAD (reactive airway disease)     Syncope     Resolved: 01/16/2017    Weight loss       Past Surgical History:     Past Surgical History:   Procedure Laterality Date    ESOPHAGOGASTRODUODENOSCOPY      with biopsy    FOOT SURGERY      MULTIPLE TOOTH EXTRACTIONS N/A 1/30/2017    Procedure: COMPLETE BONY IMPACTED EXTRACTION TEETH # 1, 16, 17, 32;  Surgeon: Taylor Marin DMD;  Location: [facility];  Service:     OVARIAN CYST REMOVAL      NV EGD TRANSORAL BIOPSY SINGLE/MULTIPLE N/A 9/26/2017    Procedure: ESOPHAGOGASTRODUODENOSCOPY (EGD); Surgeon: Mychal Webb MD;  Location: [facility];   Service: Pediatric Gastrointestinal      Social History:     Social History     Socioeconomic History    Marital status: Single     Spouse name: None    Number of children: None    Years of education: None    Highest education level: None   Occupational History    Occupation: Full time student   Tobacco Use    Smoking status: Never    Smokeless tobacco: Never   Vaping Use    Vaping Use: Never used   Substance and Sexual Activity    Alcohol use: No    Drug use: No    Sexual activity: Never   Other Topics Concern    None   Social History Narrative    Brushes teeth twice a day    Lives with parents    No tobacco/smoke exposure    Pets/animals: Dog    Sleeps 8 - 10 hours a day     Social Determinants of Health     Financial Resource Strain: Low Risk  (3/2/2021)    Overall Financial Resource Strain (CARDIA)     Difficulty of Paying Living Expenses: Not hard at all   Food Insecurity: No Food Insecurity (3/2/2021) Hunger Vital Sign     Worried About Running Out of Food in the Last Year: Never true     Ran Out of Food in the Last Year: Never true   Transportation Needs: No Transportation Needs (3/2/2021)    PRAPARE - Transportation     Lack of Transportation (Medical): No     Lack of Transportation (Non-Medical): No   Physical Activity: Inactive (3/2/2021)    Exercise Vital Sign     Days of Exercise per Week: 0 days     Minutes of Exercise per Session: 0 min   Stress: Stress Concern Present (3/2/2021)    [facility]     Feeling of Stress :  To some extent   Social Connections: Socially Isolated (3/2/2021)    Social Connection and Isolation Panel [NHANES]     Frequency of Communication with Friends and Family: More than three times a week     Frequency of Social Gatherings with Friends and Family: Never     Attends Hindu Services: Never     Active Member of Clubs or Organizations: No     Attends Club or Organization Meetings: Never     Marital Status: Never    Intimate Partner Violence: Not At Risk (3/2/2021)    Humiliation, Afraid, Rape, and Kick questionnaire     Fear of Current or Ex-Partner: No     Emotionally Abused: No     Physically Abused: No     Sexually Abused: No   Housing Stability: Not on file      Family History:     Family History   Problem Relation Age of Onset    No Known Problems Mother     No Known Problems Father     Asthma Sister     Diabetes Maternal Grandmother     Kidney cancer Family     Asthma Family     Heart disease Family     Asthma Other     Kidney cancer Family     Other Family         Cardiac disorder    Asthma Brother     Cancer Paternal Grandfather     No Known Problems Maternal Grandfather     No Known Problems Paternal Grandmother     No Known Problems Maternal Aunt     No Known Problems Maternal Aunt     No Known Problems Maternal Aunt     No Known Problems Maternal Aunt     No Known Problems Paternal Aunt Miscarriages / Stillbirths Neg Hx     Substance Abuse Neg Hx       Current Medications:     No current outpatient medications on file. No current facility-administered medications for this visit. Allergies: Allergies   Allergen Reactions    Eggs Or Egg-Derived Products - Food Allergy Hives    Other Hives, Itching and Other (See Comments)     Cold water/temperatures  Pt has passed out from the cold  "cold urticaria"    Seasonal Ic  [Cholestatin]       Physical Exam:     BP 92/64   Pulse 84   Temp 98.2 °F (36.8 °C) (Temporal)   Resp 16   Ht 5' 2.25" (1.581 m)   Wt 49.4 kg (108 lb 12.8 oz)   LMP 10/10/2023 (Approximate)   SpO2 98%   BMI 19.74 kg/m²     Physical Exam  Vitals reviewed. Constitutional:       Appearance: Normal appearance. HENT:      Head: Normocephalic and atraumatic. Eyes:      General:         Right eye: No discharge. Left eye: No discharge. Conjunctiva/sclera: Conjunctivae normal.   Neck:      Vascular: No carotid bruit. Cardiovascular:      Rate and Rhythm: Normal rate and regular rhythm. Heart sounds: Normal heart sounds. No murmur heard. No friction rub. No gallop. Pulmonary:      Effort: Pulmonary effort is normal. No respiratory distress. Breath sounds: Normal breath sounds. No wheezing or rales. Abdominal:      General: Bowel sounds are normal. There is no distension. Palpations: Abdomen is soft. Tenderness: There is no abdominal tenderness. Musculoskeletal:         General: No swelling, tenderness or deformity. Normal range of motion. Cervical back: Normal range of motion and neck supple. No muscular tenderness. Lymphadenopathy:      Cervical: No cervical adenopathy. Neurological:      Mental Status: She is alert.    Psychiatric:         Mood and Affect: Mood normal.          Willem Morris MD   [address] [place] [address]

## 2023-10-24 ENCOUNTER — ANNUAL EXAM (OUTPATIENT)
Dept: OBGYN CLINIC | Facility: CLINIC | Age: 23
End: 2023-10-24
Payer: COMMERCIAL

## 2023-10-24 VITALS
BODY MASS INDEX: 19.67 KG/M2 | DIASTOLIC BLOOD PRESSURE: 60 MMHG | SYSTOLIC BLOOD PRESSURE: 102 MMHG | HEIGHT: 63 IN | WEIGHT: 111 LBS

## 2023-10-24 DIAGNOSIS — N89.6 TIGHT HYMENAL RING: ICD-10-CM

## 2023-10-24 DIAGNOSIS — Z01.419 WOMEN'S ANNUAL ROUTINE GYNECOLOGICAL EXAMINATION: Primary | ICD-10-CM

## 2023-10-24 PROCEDURE — S0612 ANNUAL GYNECOLOGICAL EXAMINA: HCPCS | Performed by: PHYSICIAN ASSISTANT

## 2023-10-24 NOTE — PROGRESS NOTES
ASSESSMENT & PLAN:   Diagnoses and all orders for this visit:    Women's annual routine gynecological examination    Tight hymenal ring  Comments:  rec dilators, pelvic PT  Orders:  -     Ambulatory Referral to Physical Therapy; Future          The following were reviewed in today's visit: ASCCP guidelines, Gardisil vaccination, STD testing breast self exam, STD testing, exercise, and healthy diet. Patient to return to office in yearly for annual exam.     All questions have been answered to her satisfaction. CC:  Annual Gynecologic Examination  Chief Complaint   Patient presents with    Gynecologic Exam     Annual exam, pap not indicated. Pt is well, states there are no concerns. Last pap 10/18/2022 Neg pap        HPI: Becky Milian is a 20 y.o. Alyne Gelineau who presents for annual gynecologic examination. She has the following concerns:  none       Health Maintenance:    Exercise: frequently  Breast exams/breast awareness: yes    Past Medical History:   Diagnosis Date    Asthma     Congenital hemihypertrophy     Depression 08/24/2020    Early satiety     Exotropia     Gastric mucosal hypertrophy     Leg length discrepancy     Occasional tremors     Orthostasis     RAD (reactive airway disease)     Syncope     Resolved: 01/16/2017       Past Surgical History:   Procedure Laterality Date    ESOPHAGOGASTRODUODENOSCOPY      with biopsy    FOOT SURGERY      MULTIPLE TOOTH EXTRACTIONS N/A 1/30/2017    Procedure: COMPLETE BONY IMPACTED EXTRACTION TEETH # 1, 16, 17, 32;  Surgeon: Tez Mobley DMD;  Location: [facility];  Service:     OVARIAN CYST REMOVAL      IN EGD TRANSORAL BIOPSY SINGLE/MULTIPLE N/A 9/26/2017    Procedure: ESOPHAGOGASTRODUODENOSCOPY (EGD); Surgeon: Calvin Alves MD;  Location: [facility];   Service: Pediatric Gastrointestinal       Past OB/Gyn History:  Period Cycle (Days): 28  Period Duration (Days): 5-6  Period Pattern: Regular  Menstrual Flow: Light, Moderate, Heavy  Menstrual Control: Maxi pad, Thin pad, Panty linerPatient's last menstrual period was 10/12/2023 (approximate). Last Pap: 2022 : no abnormalities  History of abnormal Pap smear: no  HPV vaccine completed: yes    Patient has not been sexually active.    STD testing: no  Current contraception: none      Family History  Family History   Problem Relation Age of Onset    No Known Problems Mother     No Known Problems Father     Asthma Sister     Asthma Brother     Diabetes Maternal Grandmother     No Known Problems Maternal Grandfather     No Known Problems Paternal Grandmother     Cancer Paternal Grandfather     No Known Problems Maternal Aunt     No Known Problems Maternal Aunt     No Known Problems Maternal Aunt     No Known Problems Maternal Aunt     No Known Problems Paternal Aunt     Asthma Other     Kidney cancer Family     Asthma Family     Heart disease Family     Kidney cancer Family     Other Family         Cardiac disorder    Miscarriages / Stillbirths Neg Hx     Substance Abuse Neg Hx        Family history of uterine or ovarian cancer: no  Family history of breast cancer: no  Family history of colon cancer: no    Social History:  Social History     Socioeconomic History    Marital status: Single     Spouse name: Not on file    Number of children: Not on file    Years of education: Not on file    Highest education level: Not on file   Occupational History    Occupation: Full time student   Tobacco Use    Smoking status: Never    Smokeless tobacco: Never   Vaping Use    Vaping Use: Never used   Substance and Sexual Activity    Alcohol use: No    Drug use: No    Sexual activity: Never   Other Topics Concern    Not on file   Social History Narrative    Brushes teeth twice a day    Lives with parents    No tobacco/smoke exposure    Pets/animals: Dog    Sleeps 8 - 10 hours a day     Social Determinants of Health     Financial Resource Strain: Low Risk  (3/2/2021)    Overall Financial Resource Strain ([facility]) Difficulty of Paying Living Expenses: Not hard at all   Food Insecurity: No Food Insecurity (3/2/2021)    Hunger Vital Sign     Worried About Running Out of Food in the Last Year: Never true     Ran Out of Food in the Last Year: Never true   Transportation Needs: No Transportation Needs (3/2/2021)    PRAPARE - Transportation     Lack of Transportation (Medical): No     Lack of Transportation (Non-Medical): No   Physical Activity: Inactive (3/2/2021)    Exercise Vital Sign     Days of Exercise per Week: 0 days     Minutes of Exercise per Session: 0 min   Stress: Stress Concern Present (3/2/2021)    [facility]     Feeling of Stress : To some extent   Social Connections: Socially Isolated (3/2/2021)    Social Connection and Isolation Panel [NHANES]     Frequency of Communication with Friends and Family: More than three times a week     Frequency of Social Gatherings with Friends and Family: Never     Attends Restorationist Services: Never     Active Member of Clubs or Organizations: No     Attends Club or Organization Meetings: Never     Marital Status: Never    Intimate Partner Violence: Not At Risk (3/2/2021)    Humiliation, Afraid, Rape, and Kick questionnaire     Fear of Current or Ex-Partner: No     Emotionally Abused: No     Physically Abused: No     Sexually Abused: No   Housing Stability: Not on file     Domestic violence screen: negative    Allergies: Allergies   Allergen Reactions    Eggs Or Egg-Derived Products - Food Allergy Hives    Other Hives, Itching and Other (See Comments)     Cold water/temperatures  Pt has passed out from the cold  "cold urticaria"    Seasonal Ic  [Cholestatin]        Medications:  No current outpatient medications on file. Review of Systems:  Review of Systems   Constitutional:  Negative for chills, fever and unexpected weight change. Respiratory:  Negative for shortness of breath.     Cardiovascular: Negative for chest pain. Gastrointestinal:  Negative for abdominal pain, diarrhea, nausea and vomiting. Skin:  Negative for rash. Psychiatric/Behavioral:  Negative for dysphoric mood. The patient is not nervous/anxious. Physical Exam:  /60 (BP Location: Left arm, Patient Position: Sitting, Cuff Size: Standard)   Ht 5' 2.5" (1.588 m)   Wt 50.3 kg (111 lb)   LMP 10/12/2023 (Approximate)   BMI 19.98 kg/m²    Physical Exam  Constitutional:       Appearance: Normal appearance. Genitourinary:      Vulva and urethral meatus normal.      No lesions in the vagina. Right Labia: No rash or lesions. Left Labia: No lesions or rash. No vaginal discharge, erythema or bleeding. Right Adnexa: not tender and no mass present. Left Adnexa: not tender and no mass present. No cervical discharge or lesion. Uterus is not tender. Breasts:     Breasts are symmetrical.      Right: No mass or skin change. Left: No mass or skin change. HENT:      Head: Normocephalic and atraumatic. Cardiovascular:      Rate and Rhythm: Normal rate and regular rhythm. Heart sounds: Normal heart sounds. No murmur heard. No friction rub. No gallop. Pulmonary:      Effort: Pulmonary effort is normal.      Breath sounds: Normal breath sounds. No wheezing, rhonchi or rales. Abdominal:      General: Abdomen is flat. There is no distension. Palpations: Abdomen is soft. Tenderness: There is no abdominal tenderness. Musculoskeletal:      Cervical back: Neck supple. Lymphadenopathy:      Upper Body:      Right upper body: No axillary adenopathy. Left upper body: No axillary adenopathy. Neurological:      General: No focal deficit present. Mental Status: She is alert. Skin:     General: Skin is warm and dry. Psychiatric:         Mood and Affect: Mood normal.         Behavior: Behavior normal.   Vitals reviewed.

## 2023-10-30 NOTE — ASSESSMENT & PLAN NOTE
Numbness of arms under warm water. Occasionally on legs. Will check vit B12/ folate and EMG. Reviewed with patient and agree with technician note, also regarding: Past Ocular/Family Ocular Hx,   allergies and medications.     ADRIANA: 10/22 with me    Past Ocular history: here for complete eye exam. No vision changes noted. Wears glasses in the classroom only, working well. Frames are broken but wearable.     Refraction performed if patient notes vision changes. Done with combination of retinoscopy and manifest.  Over refraction-only* is performed if patient had no visual complaints, or declined full refraction.    Social/Occupational Hx/Notes: may be starting volleyball and joining a play    Head/Face Exam: Normal  Mental Status:  Alert/Oriented x 3  See other clinical data/information in relevant sections.    ASSESSMENT/PLAN  Refractive error - stable, a copy of the current glasses prescription was given    RTC: 1 year for complete eye exam.

## 2023-12-03 ENCOUNTER — APPOINTMENT (OUTPATIENT)
Dept: LAB | Facility: CLINIC | Age: 23
End: 2023-12-03
Payer: COMMERCIAL

## 2023-12-03 DIAGNOSIS — G62.9 NEUROPATHY: ICD-10-CM

## 2023-12-03 LAB
FOLATE SERPL-MCNC: 13.1 NG/ML
VIT B12 SERPL-MCNC: 393 PG/ML (ref 180–914)

## 2023-12-03 PROCEDURE — 82746 ASSAY OF FOLIC ACID SERUM: CPT

## 2023-12-03 PROCEDURE — 36415 COLL VENOUS BLD VENIPUNCTURE: CPT

## 2023-12-03 PROCEDURE — 82607 VITAMIN B-12: CPT

## 2024-07-11 ENCOUNTER — OFFICE VISIT (OUTPATIENT)
Dept: FAMILY MEDICINE CLINIC | Facility: CLINIC | Age: 24
End: 2024-07-11
Payer: COMMERCIAL

## 2024-07-11 VITALS
HEART RATE: 91 BPM | RESPIRATION RATE: 18 BRPM | BODY MASS INDEX: 18.84 KG/M2 | HEIGHT: 62 IN | WEIGHT: 102.4 LBS | OXYGEN SATURATION: 99 % | DIASTOLIC BLOOD PRESSURE: 58 MMHG | TEMPERATURE: 98.2 F | SYSTOLIC BLOOD PRESSURE: 100 MMHG

## 2024-07-11 DIAGNOSIS — R22.2 LUMP IN CHEST: Primary | ICD-10-CM

## 2024-07-11 PROCEDURE — 99214 OFFICE O/P EST MOD 30 MIN: CPT | Performed by: FAMILY MEDICINE

## 2024-07-11 NOTE — PROGRESS NOTES
Ambulatory Visit  Name: Milvia Don      : 2000      MRN: [id number]  Encounter Provider: Monico Galicia MD  Encounter Date: 2024   Encounter department: [facility]  Chief Complaint   Patient presents with    Swelling     above breast near armpit on right side      Health Maintenance   Topic Date Due    Hepatitis C Screening  Never done    IPV Vaccine (4 of 4 - 4-dose series) 2003    HIV Screening  Never done    COVID-19 Vaccine (3 -  season) 2023    Influenza Vaccine (1) 2024    Annual Physical  10/24/2024    Depression Screening  2025    Cervical Cancer Screening  10/18/2025    DTaP,Tdap,and Td Vaccines (8 - Td or Tdap) 10/14/2032    Zoster Vaccine (1 of 2) 2049    RSV Vaccine Age 60+ Years (1 - 1-dose 60+ series) 2059    Pneumococcal Vaccine: Pediatrics (0 to 5 Years) and At-Risk Patients (6 to 64 Years)  Completed    HIB Vaccine  Completed    Hepatitis A Vaccine  Completed    HPV Vaccine  Completed    RSV Vaccine age 0-20 Months  Aged Out    Meningococcal ACWY Vaccine  Aged Out     Assessment & Plan   1. Lump in chest  -     US superficial lump (non extremity); Future; Expected date: 2024       Check US to r/o lipoma.     History of Present Illness     HPI    Pt is here by herself.   Swollen of right upper chest for 2 weeks.   No pain.   Denies injury/fall.   Some neck pain.   Denies fever.   Denies SOB,CP, n/v/abd pain.       Review of Systems   Constitutional:  Negative for appetite change, chills and fever.   HENT:  Negative for congestion, ear pain, sinus pain and sore throat.    Eyes:  Negative for discharge and itching.   Respiratory:  Negative for apnea, cough, chest tightness, shortness of breath and wheezing.    Cardiovascular:  Negative for chest pain, palpitations and leg swelling.   Gastrointestinal:  Negative for abdominal pain, anal bleeding, constipation, diarrhea, nausea and vomiting.   Endocrine: Negative for cold  "intolerance, heat intolerance and polyuria.   Genitourinary:  Negative for difficulty urinating and dysuria.   Musculoskeletal:  Positive for neck pain. Negative for arthralgias, back pain and myalgias.   Skin:  Negative for rash.   Neurological:  Negative for dizziness and headaches.   Psychiatric/Behavioral:  Negative for agitation.        Objective     /58   Pulse 91   Temp 98.2 °F (36.8 °C) (Temporal)   Resp 18   Ht 5' 2.25\" (1.581 m)   Wt 46.4 kg (102 lb 6.4 oz)   SpO2 99%   BMI 18.58 kg/m²     Physical Exam  Constitutional:       General: She is not in acute distress.     Appearance: She is well-developed.   HENT:      Head: Normocephalic.   Eyes:      General:         Right eye: No discharge.         Left eye: No discharge.      Conjunctiva/sclera: Conjunctivae normal.   Neck:      Thyroid: No thyromegaly.   Cardiovascular:      Rate and Rhythm: Normal rate and regular rhythm.      Heart sounds: Normal heart sounds. No murmur heard.     No friction rub. No gallop.   Pulmonary:      Effort: Pulmonary effort is normal. No respiratory distress.      Breath sounds: Normal breath sounds. No wheezing or rales.   Chest:      Chest wall: No tenderness.   Abdominal:      General: Bowel sounds are normal. There is no distension.      Palpations: Abdomen is soft. There is no mass.      Tenderness: There is no abdominal tenderness. There is no guarding or rebound.   Musculoskeletal:         General: No tenderness or deformity. Normal range of motion.      Cervical back: Normal range of motion.      Comments: I did not feel any Right upper chest swelling  No erythema, no tenderness   Lymphadenopathy:      Cervical: No cervical adenopathy.   Neurological:      Mental Status: She is alert.               "

## 2024-07-18 ENCOUNTER — HOSPITAL ENCOUNTER (OUTPATIENT)
Dept: RADIOLOGY | Facility: HOSPITAL | Age: 24
Discharge: HOME | End: 2024-07-18
Payer: COMMERCIAL

## 2024-07-18 DIAGNOSIS — R22.2 LUMP IN CHEST: ICD-10-CM

## 2024-07-18 PROCEDURE — 76705 ECHO EXAM OF ABDOMEN: CPT

## 2024-10-22 ENCOUNTER — APPOINTMENT (OUTPATIENT)
Dept: LAB | Facility: CLINIC | Age: 24
End: 2024-10-22

## 2024-10-22 ENCOUNTER — OCCMED (OUTPATIENT)
Dept: URGENT CARE | Facility: CLINIC | Age: 24
End: 2024-10-22

## 2024-10-22 DIAGNOSIS — Z02.1 PRE-EMPLOYMENT HEALTH SCREENING EXAMINATION: Primary | ICD-10-CM

## 2024-10-22 DIAGNOSIS — Z02.1 PRE-EMPLOYMENT HEALTH SCREENING EXAMINATION: ICD-10-CM

## 2024-10-22 LAB
MEV IGG SER QL IA: NORMAL
MUV IGG SER QL IA: NORMAL
RUBV IGG SERPL IA-ACNC: 62.7 IU/ML
VZV IGG SER QL IA: ABNORMAL

## 2024-10-22 PROCEDURE — 86787 VARICELLA-ZOSTER ANTIBODY: CPT

## 2024-10-22 PROCEDURE — 86765 RUBEOLA ANTIBODY: CPT

## 2024-10-22 PROCEDURE — 36415 COLL VENOUS BLD VENIPUNCTURE: CPT

## 2024-10-22 PROCEDURE — 86735 MUMPS ANTIBODY: CPT

## 2024-10-22 PROCEDURE — 86762 RUBELLA ANTIBODY: CPT

## 2024-10-22 PROCEDURE — 86480 TB TEST CELL IMMUN MEASURE: CPT

## 2024-10-23 LAB
GAMMA INTERFERON BACKGROUND BLD IA-ACNC: 0 IU/ML
M TB IFN-G BLD-IMP: NEGATIVE
M TB IFN-G CD4+ BCKGRND COR BLD-ACNC: 0 IU/ML
M TB IFN-G CD4+ BCKGRND COR BLD-ACNC: 0 IU/ML
MITOGEN IGNF BCKGRD COR BLD-ACNC: 8.75 IU/ML

## 2025-04-02 ENCOUNTER — OFFICE VISIT (OUTPATIENT)
Dept: FAMILY MEDICINE CLINIC | Facility: CLINIC | Age: 25
End: 2025-04-02
Payer: COMMERCIAL

## 2025-04-02 VITALS
TEMPERATURE: 97.6 F | DIASTOLIC BLOOD PRESSURE: 68 MMHG | WEIGHT: 110.8 LBS | HEIGHT: 63 IN | SYSTOLIC BLOOD PRESSURE: 98 MMHG | RESPIRATION RATE: 18 BRPM | OXYGEN SATURATION: 98 % | BODY MASS INDEX: 19.63 KG/M2 | HEART RATE: 73 BPM

## 2025-04-02 DIAGNOSIS — Z13.6 SCREENING FOR CARDIOVASCULAR CONDITION: Primary | ICD-10-CM

## 2025-04-02 DIAGNOSIS — J30.1 SEASONAL ALLERGIC RHINITIS DUE TO POLLEN: ICD-10-CM

## 2025-04-02 DIAGNOSIS — J45.20 MILD INTERMITTENT ASTHMA WITHOUT COMPLICATION: ICD-10-CM

## 2025-04-02 DIAGNOSIS — R63.6 UNDERWEIGHT: ICD-10-CM

## 2025-04-02 DIAGNOSIS — L24.9 IRRITANT CONTACT DERMATITIS, UNSPECIFIED TRIGGER: ICD-10-CM

## 2025-04-02 DIAGNOSIS — Z13.1 SCREENING FOR DIABETES MELLITUS: ICD-10-CM

## 2025-04-02 DIAGNOSIS — Z00.00 ANNUAL PHYSICAL EXAM: ICD-10-CM

## 2025-04-02 PROBLEM — G62.9 NEUROPATHY: Status: RESOLVED | Noted: 2023-10-17 | Resolved: 2025-04-02

## 2025-04-02 PROCEDURE — 99395 PREV VISIT EST AGE 18-39: CPT | Performed by: NURSE PRACTITIONER

## 2025-04-02 NOTE — PROGRESS NOTES
Adult Annual Physical  Name: Milvia Don      : 2000      MRN: [id number]  Encounter Provider: YARELY Riggins  Encounter Date: 2025   Encounter department: [facility]    :  Assessment & Plan  Screening for cardiovascular condition    Orders:    Lipid Panel with Direct LDL reflex; Future    Screening for diabetes mellitus    Orders:    Hemoglobin A1C; Future    Annual physical exam    Orders:    CBC and differential; Future    Basic metabolic panel; Future    Lipid Panel with Direct LDL reflex; Future    Hemoglobin A1C; Future    Irritant contact dermatitis, unspecified trigger  Works in the hospital.  Frequent hand washing, sometimes needs to wear gloves. Dryness of both hands.  Provided the patient information regarding dry skin and treatment. Refer to dermatology as requested.    Orders:    Ambulatory Referral to Dermatology; Future    Seasonal allergic rhinitis due to pollen  Well controlled. Currently on no medications         Mild intermittent asthma without complication  Controlled at present time.          Underweight  Bmi 19.94.  Patient has struggled to gain weight. She feels she is eating well.  Does not exercise. Supplement with protein drinks.              Preventive Screenings:  - Diabetes Screening: screening not indicated  - Cholesterol Screening: orders placed   - Hepatitis C screening: screening not indicated   - HIV screening: screening not indicated   - Cervical cancer screening: screening up-to-date   - Colon cancer screening: screening not indicated   - Lung cancer screening: screening not indicated     Counseling/Anticipatory Guidance:  - Alcohol: discussed moderation in alcohol intake and recommendations for healthy alcohol use.   - Drug use: discussed harms of illicit drug use and how it can negatively impact mental/physical health.   - Dental health: discussed importance of regular tooth brushing, flossing, and dental visits.   - Diet: discussed  recommendations for a healthy/well-balanced diet.   - Exercise: the importance of regular exercise/physical activity was discussed. Recommend exercise 3-5 times per week for at least 30 minutes.       Depression Screening and Follow-up Plan: Patient was screened for depression during today's encounter. They screened negative with a PHQ-2 score of 0.          History of Present Illness     Adult Annual Physical:  Patient presents for annual physical. Patient present to the office today for annual physical.  Overall the patient feels well.  She will be due for surveillance blood work and this is been ordered.  She is on no medications.  She is working in the hospital and transport.  She does follow with gynecology yearly.  She is not sexually active..     Diet and Physical Activity:  - Diet/Nutrition: no special diet. increase dietary intake  - Exercise: walking.    Depression Screening:  - PHQ-2 Score: 0    General Health:  - Sleep: 7-8 hours of sleep on average and sleeps well.  - Hearing: normal hearing bilateral ears.  - Vision: wears glasses and most recent eye exam < 1 year ago.  - Dental: no dental visits for > 1 year and brushes teeth twice daily.    /GYN Health:  - Follows with GYN: yes.   - Menopause: premenopausal.   - Last menstrual cycle: 3/29/2025.   - History of STDs: no    Review of Systems   Constitutional:  Negative for activity change, fatigue and fever.   HENT:  Negative for congestion, hearing loss, rhinorrhea, trouble swallowing and voice change.    Eyes:  Negative for photophobia, pain, discharge and visual disturbance.   Respiratory:  Negative for cough, chest tightness and shortness of breath.    Cardiovascular:  Negative for chest pain, palpitations and leg swelling.   Gastrointestinal:  Negative for abdominal pain, blood in stool, constipation, nausea and vomiting.   Endocrine: Negative for cold intolerance and heat intolerance.   Genitourinary:  Negative for difficulty urinating,  "frequency, hematuria, urgency, vaginal bleeding and vaginal discharge.   Musculoskeletal:  Negative for arthralgias and myalgias.   Skin: Negative.         Dry skin   Neurological:  Negative for dizziness, weakness, numbness and headaches.   Psychiatric/Behavioral:  Negative for decreased concentration. The patient is not nervous/anxious.      No current outpatient medications on file prior to visit.     No current facility-administered medications on file prior to visit.      Social History     Tobacco Use    Smoking status: Never    Smokeless tobacco: Never   Vaping Use    Vaping status: Never Used   Substance and Sexual Activity    Alcohol use: Yes     Comment: rarely    Drug use: No    Sexual activity: Never       Objective   BP 98/68   Pulse 73   Temp 97.6 °F (36.4 °C) (Tympanic)   Resp 18   Ht 5' 2.5\" (1.588 m)   Wt 50.3 kg (110 lb 12.8 oz)   SpO2 98%   BMI 19.94 kg/m²     Physical Exam  Vitals and nursing note reviewed.   Constitutional:       General: She is not in acute distress.     Appearance: Normal appearance. She is underweight.   HENT:      Head: Normocephalic and atraumatic.      Right Ear: Tympanic membrane, ear canal and external ear normal. There is no impacted cerumen.      Left Ear: Tympanic membrane, ear canal and external ear normal. There is no impacted cerumen.      Nose: Nose normal.      Mouth/Throat:      Mouth: Mucous membranes are moist.      Pharynx: Oropharynx is clear. No posterior oropharyngeal erythema.   Eyes:      General:         Right eye: No discharge.         Left eye: No discharge.      Extraocular Movements: Extraocular movements intact.      Pupils: Pupils are equal, round, and reactive to light.   Cardiovascular:      Rate and Rhythm: Normal rate and regular rhythm.      Pulses: Normal pulses.      Heart sounds: Normal heart sounds. No murmur heard.  Pulmonary:      Effort: Pulmonary effort is normal.      Breath sounds: Normal breath sounds.   Abdominal:      " General: Bowel sounds are normal.      Palpations: Abdomen is soft.   Musculoskeletal:         General: Normal range of motion.      Cervical back: Normal range of motion.   Skin:     General: Skin is warm and dry.      Capillary Refill: Capillary refill takes less than 2 seconds.   Neurological:      General: No focal deficit present.      Mental Status: She is alert and oriented to person, place, and time. Mental status is at baseline.   Psychiatric:         Mood and Affect: Mood normal.         Behavior: Behavior normal.         Thought Content: Thought content normal.         Judgment: Judgment normal.

## 2025-04-02 NOTE — ASSESSMENT & PLAN NOTE
Bmi 19.94.  Patient has struggled to gain weight. She feels she is eating well.  Does not exercise. Supplement with protein drinks.

## 2025-04-02 NOTE — PATIENT INSTRUCTIONS
Assessment and Plan:  Based on a thorough discussion of this condition and the management approach to it (including a comprehensive discussion of the known risks, side effects and potential benefits of treatment), the patient (family) agrees to implement the following specific plan:  Use moisturizer like Eucerin,Cerave or Aveeno Cream 3 times a day for the dry skin               Dry skin refers to skin that feels dry to touch. Dry skin has a dull surface with a rough, scaly quality. The skin is less pliable and cracked. When dryness is severe, the skin may become inflamed and fissured.  Although any body site can be dry, dry skin tends to affect the shins more than any other site.     Dry skin is lacking moisture in the outer horny cell layer (stratum corneum) and this results in cracks in the skin surface.  Dry skin is also called xerosis, xeroderma or asteatosis (lack of fat).  It can affect males and females of all ages. There is some racial variability in water and lipid content of the skin.  Dry skin that starts in early childhood may be one of about 20 types of ichthyosis (fish-scale skin). There is often a family history of dry skin.   Dry skin is commonly seen in people with atopic dermatitis.  Nearly everyone > 60 years has dry skin.     Dry skin that begins later may be seen in people with certain diseases and conditions.  Postmenopausal women  Hypothyroidism  Chronic renal disease   Malnutrition and weight loss   Subclinical dermatitis   Treatment with certain drugs such as oral retinoids, diuretics and epidermal growth factor receptor inhibitors        What is the treatment for dry skin?  The mainstay of treatment of dry skin and ichthyosis is moisturisers/emollients. They should be applied liberally and often enough to:  Reduce itch   Improve the barrier function   Prevent entry of irritants, bacteria   Reduce transepidermal water loss.        How can dry skin be prevented?  Eliminate aggravating  factors:  Reduce the frequency of bathing.   A humidifier in winter and air conditioner in summer   Compare having a short shower with a prolonged soak in a bath.   Use lukewarm, not hot, water.   Replace standard soap with a substitute such as a synthetic detergent cleanser, water-miscible emollient, bath oil, anti-pruritic tar oil, colloidal oatmeal etc.   Apply an emollient liberally and often, particularly shortly after bathing, and when itchy. The drier the skin, the thicker this should be, especially on the hands.     What is the outlook for dry skin?  A tendency to dry skin may persist life-long, or it may improve once contributing factors are controlled.

## 2025-04-04 ENCOUNTER — HOSPITAL ENCOUNTER (EMERGENCY)
Facility: HOSPITAL | Age: 25
Discharge: HOME | End: 2025-04-04
Attending: EMERGENCY MEDICINE
Payer: COMMERCIAL

## 2025-04-04 ENCOUNTER — RESULTS FOLLOW-UP (OUTPATIENT)
Dept: EMERGENCY DEPT | Facility: HOSPITAL | Age: 25
End: 2025-04-04

## 2025-04-04 VITALS
RESPIRATION RATE: 16 BRPM | OXYGEN SATURATION: 97 % | DIASTOLIC BLOOD PRESSURE: 71 MMHG | TEMPERATURE: 99.4 F | HEART RATE: 118 BPM | SYSTOLIC BLOOD PRESSURE: 102 MMHG

## 2025-04-04 DIAGNOSIS — K29.70 VIRAL GASTRITIS: Primary | ICD-10-CM

## 2025-04-04 LAB
EXT PREGNANCY TEST URINE: NEGATIVE
EXT. CONTROL: NORMAL
FLUAV RNA RESP QL NAA+PROBE: NEGATIVE
FLUBV RNA RESP QL NAA+PROBE: NEGATIVE
RSV RNA RESP QL NAA+PROBE: NEGATIVE
SARS-COV-2 RNA RESP QL NAA+PROBE: POSITIVE

## 2025-04-04 PROCEDURE — 81025 URINE PREGNANCY TEST: CPT

## 2025-04-04 PROCEDURE — 0241U HB NFCT DS VIR RESP RNA 4 TRGT: CPT

## 2025-04-04 PROCEDURE — 99284 EMERGENCY DEPT VISIT MOD MDM: CPT | Performed by: EMERGENCY MEDICINE

## 2025-04-04 PROCEDURE — 96372 THER/PROPH/DIAG INJ SC/IM: CPT

## 2025-04-04 PROCEDURE — 99283 EMERGENCY DEPT VISIT LOW MDM: CPT

## 2025-04-04 RX ORDER — ACETAMINOPHEN 325 MG/1
975 TABLET ORAL ONCE
Status: COMPLETED | OUTPATIENT
Start: 2025-04-04 | End: 2025-04-04

## 2025-04-04 RX ORDER — ONDANSETRON 4 MG/1
4 TABLET, ORALLY DISINTEGRATING ORAL ONCE
Status: COMPLETED | OUTPATIENT
Start: 2025-04-04 | End: 2025-04-04

## 2025-04-04 RX ORDER — KETOROLAC TROMETHAMINE 30 MG/ML
15 INJECTION, SOLUTION INTRAMUSCULAR; INTRAVENOUS ONCE
Status: COMPLETED | OUTPATIENT
Start: 2025-04-04 | End: 2025-04-04

## 2025-04-04 RX ORDER — ONDANSETRON 4 MG/1
4 TABLET, FILM COATED ORAL EVERY 6 HOURS
Qty: 12 TABLET | Refills: 0 | Status: SHIPPED | OUTPATIENT
Start: 2025-04-04

## 2025-04-04 RX ADMIN — ONDANSETRON 4 MG: 4 TABLET, ORALLY DISINTEGRATING ORAL at 11:19

## 2025-04-04 RX ADMIN — ACETAMINOPHEN 975 MG: 325 TABLET, FILM COATED ORAL at 11:19

## 2025-04-04 RX ADMIN — KETOROLAC TROMETHAMINE 15 MG: 30 INJECTION, SOLUTION INTRAMUSCULAR; INTRAVENOUS at 11:53

## 2025-04-04 NOTE — Clinical Note
Milvia Don was seen and treated in our emergency department on 4/4/2025.                Diagnosis:     Milvia  is off the rest of the shift today, may return to work on return date.    She may return on this date: 04/06/2025         If you have any questions or concerns, please don't hesitate to call.      Ruperto Sauceda, DO    ______________________________           _______________          _______________  Hospital Representative                              Date                                Time

## 2025-04-04 NOTE — DISCHARGE INSTRUCTIONS
Please return to the emergency department if you develop new or worsening symptoms including fever, chest pain, shortness of breath, nausea and vomiting that does not resolve on its own    Please follow-up with your primary care provider for additional care and management of your recent ER visit     Please continue to take your home medications as prescribed, please take your newly prescribed medication Zofran as needed for nausea vomiting, you may take Pepto-Bismol for diarrhea, Motrin Tylenol as needed for pain

## 2025-04-04 NOTE — ED PROVIDER NOTES
Time reflects when diagnosis was documented in both MDM as applicable and the Disposition within this note       Time User Action Codes Description Comment    4/4/2025 12:12 PM Ruperto Sauceda Add [K29.70] Viral gastritis           ED Disposition       ED Disposition   Discharge    Condition   Stable    Date/Time   Fri Apr 4, 2025 12:12 PM    Comment   Milvia Don discharge to home/self care.                   Assessment & Plan   {Hyperlinks  Risk Stratification - NIHSS - HEART SCORE - Fill out sepsis note and make sure you call [id number] if severe or septic shock:[id number]}    Medical Decision Making  Patient is a 24 y.o. female with PMH of allergic rhinitis, asthma, who presents to the ED with complaint of vomiting,    On presentation patient has an elevated heart rate of 118, temperature of 99.4, otherwise vital signs are within normal limits, on exam, pt is alert, oriented, no evidence of respiratory distress, see physical exam for additional details    History and physical exam most consistent with viral gastritis, however, differential diagnosis included but not limited to COVID/flu, plan COVID/flu swab, Zofran, Motrin/Tylenol,    View ED course above for further discussion on patient workup.     ***    All labs reviewed and utilized in the medical decision making process  All radiology studies independently viewed by me and interpreted by the radiologist.  I reviewed all testing with the patient.     Upon re-evaluation ***      Amount and/or Complexity of Data Reviewed  Labs: ordered. Decision-making details documented in ED Course.    Risk  OTC drugs.  Prescription drug management.        ED Course as of 04/04/25 1428   Fri Apr 04, 2025   1052 Sniffling, first thing this morning, fever last night, took tylenol, went to work, 8-9am, shivers, pain diffusely, intensified vomited, dad is sick with similar symptoms, would like covid/flu testing, no work note needed, -chest pain, -sob, -worsening asthma, -rash,  -diarrhea, -bloody bm/urine, took tylenol at 1am, -numbness / tingling, -abdominal pain, LMP - Monday / Tuesday ended, -changes in periods, -burning w/ urination   1212 PREGNANCY TEST URINE: Negative       Medications   acetaminophen (TYLENOL) tablet 975 mg (975 mg Oral Given 4/4/25 1119)   ketorolac (TORADOL) injection 15 mg (15 mg Intramuscular Given 4/4/25 1153)   ondansetron (ZOFRAN-ODT) dispersible tablet 4 mg (4 mg Oral Given 4/4/25 1119)       ED Risk Strat Scores                                                History of Present Illness   {Hyperlinks  History (Med, Surg, Fam, Social) - Current Medications - Allergies  :[id number]}    Chief Complaint   Patient presents with    Vomiting     Vomited at work this morning       Past Medical History:   Diagnosis Date    Allergic N/A    Asthma     Congenital hemihypertrophy     Depression 08/24/2020    Early satiety     Exotropia     Gastric mucosal hypertrophy     GERD (gastroesophageal reflux disease) 2022    Leg length discrepancy     Leg length discrepancy 05/11/2016    Neuropathy 10/17/2023    Occasional tremors     Orthostasis     RAD (reactive airway disease)     Syncope     Resolved: 01/16/2017    Visual impairment 2001/2002    Lazy eye      Past Surgical History:   Procedure Laterality Date    ESOPHAGOGASTRODUODENOSCOPY      with biopsy    FOOT SURGERY      MULTIPLE TOOTH EXTRACTIONS N/A 1/30/2017    Procedure: COMPLETE BONY IMPACTED EXTRACTION TEETH # 1, 16, 17, 32;  Surgeon: Robyn Dailey DMD;  Location: [facility];  Service:     OVARIAN CYST REMOVAL      KS EGD TRANSORAL BIOPSY SINGLE/MULTIPLE N/A 9/26/2017    Procedure: ESOPHAGOGASTRODUODENOSCOPY (EGD);  Surgeon: Fazal Aviles MD;  Location: [facility];  Service: Pediatric Gastrointestinal      Family History   Problem Relation Age of Onset    No Known Problems Mother     No Known Problems Father     Asthma Sister     Autoimmune disease Sister         Ulcerative Colitis    Asthma Brother      Depression Brother     Diabetes Maternal Grandmother     No Known Problems Maternal Grandfather     No Known Problems Paternal Grandmother     Cancer Paternal Grandfather     No Known Problems Maternal Aunt     No Known Problems Maternal Aunt     No Known Problems Maternal Aunt     No Known Problems Maternal Aunt     No Known Problems Paternal Aunt     Asthma Other     Kidney cancer Family     Asthma Family     Heart disease Family     Kidney cancer Family     Other Family         Cardiac disorder    Miscarriages / Stillbirths Neg Hx     Substance Abuse Neg Hx       Social History     Tobacco Use    Smoking status: Never    Smokeless tobacco: Never   Vaping Use    Vaping status: Never Used   Substance Use Topics    Alcohol use: Yes     Comment: rarely    Drug use: No      E-Cigarette/Vaping    E-Cigarette Use Never User       E-Cigarette/Vaping Substances    Nicotine No     THC No     CBD No     Flavoring No     Other No     Unknown No       I have reviewed and agree with the history as documented.     Patient is reporting that she has been feeling symptomatic since she woke up this morning with sniffling, first thing this morning, fever last night, took tylenol, went to work, 8-9am, shivers, pain diffusely, intensified vomited, dad is sick with similar symptoms, would like covid/flu testing, no work note needed, -chest pain, -sob, -worsening asthma, -rash, -diarrhea, -bloody bm/urine, took tylenol at 1am, -numbness / tingling, -abdominal pain, LMP - Monday / Tuesday ended, -changes in periods, -burning w/ urination        Review of Systems        Objective   {Hyperlinks  Historical Vitals - Historical Labs - Chart Review/Microbiology - Last Echo - Code Status  :[id number]}    ED Triage Vitals   Temperature Pulse Blood Pressure Respirations SpO2 Patient Position - Orthostatic VS   04/04/25 1040 04/04/25 1040 04/04/25 1040 04/04/25 1040 04/04/25 1040 --   97.7 °F (36.5 °C) (!) 118 102/71 16 97 %       Temp Source  Heart Rate Source BP Location FiO2 (%) Pain Score    04/04/25 1042 -- -- -- 04/04/25 1040    Oral    5      Vitals      Date and Time Temp Pulse SpO2 Resp BP Pain Score FACES Pain Rating User   04/04/25 1218 -- -- -- -- -- 3 -- ET   04/04/25 1159 -- -- -- -- -- 5 -- LTF   04/04/25 1119 -- -- -- -- -- 6 -- LTF   04/04/25 1042 99.4 °F (37.4 °C) -- -- -- -- -- -- AK   04/04/25 1040 97.7 °F (36.5 °C) 118 97 % 16 102/71 5 -- AK            Physical Exam  Vitals and nursing note reviewed.   Constitutional:       General: She is not in acute distress.     Appearance: She is well-developed. She is ill-appearing. She is not toxic-appearing.   HENT:      Head: Normocephalic and atraumatic.      Nose: Congestion and rhinorrhea present.      Mouth/Throat:      Mouth: Mucous membranes are moist.      Pharynx: Oropharynx is clear. No oropharyngeal exudate or posterior oropharyngeal erythema.   Eyes:      Conjunctiva/sclera: Conjunctivae normal.   Cardiovascular:      Rate and Rhythm: Normal rate and regular rhythm.      Heart sounds: No murmur heard.  Pulmonary:      Effort: Pulmonary effort is normal. No respiratory distress.      Breath sounds: Normal breath sounds. No wheezing, rhonchi or rales.   Abdominal:      Palpations: Abdomen is soft.      Tenderness: There is abdominal tenderness. There is no guarding or rebound.   Musculoskeletal:         General: No swelling.      Cervical back: Neck supple.   Skin:     General: Skin is warm and dry.      Capillary Refill: Capillary refill takes less than 2 seconds.      Coloration: Skin is not jaundiced or pale.   Neurological:      Mental Status: She is alert.   Psychiatric:         Mood and Affect: Mood normal.         Results Reviewed       Procedure Component Value Units Date/Time    COVID/FLU/RSV [[id number]]  (Abnormal) Collected: 04/04/25 1123    Lab Status: Final result Specimen: Nares from Nose Updated: 04/04/25 1237     SARS-CoV-2 Positive     INFLUENZA A PCR Negative      INFLUENZA B PCR Negative     RSV PCR Negative    Narrative:      This test has been performed using the CoV-2/Flu/RSV plus assay on the [employer] GeneXpert platform. This test has been validated by the  and verified by the performing laboratory.     This test is designed to amplify and detect the following: nucleocapsid (N), envelope (E), and RNA-dependent RNA polymerase (RdRP) genes of the SARS-CoV-2 genome; matrix (M), basic polymerase (PB2), and acidic protein (PA) segments of the influenza A genome; matrix (M) and non-structural protein (NS) segments of the influenza B genome, and the nucleocapsid genes of RSV A and RSV B.     Positive results are indicative of the presence of Flu A, Flu B, RSV, and/or SARS-CoV-2 RNA. Positive results for SARS-CoV-2 or suspected novel influenza should be reported to state, local, or federal health departments according to local reporting requirements.      All results should be assessed in conjunction with clinical presentation and other laboratory markers for clinical management.     FOR PEDIATRIC PATIENTS - copy/paste COVID Guidelines URL to browser: [email or web address]       POCT pregnancy, urine [[id number]]  (Normal) Collected: 04/04/25 1147    Lab Status: Final result Updated: 04/04/25 1147     EXT Preg Test, Ur Negative     Control Valid            No orders to display       Procedures    ED Medication and Procedure Management   None     Discharge Medication List as of 4/4/2025 12:15 PM        START taking these medications    Details   ondansetron (ZOFRAN) 4 mg tablet Take 1 tablet (4 mg total) by mouth every 6 (six) hours, Starting Fri 4/4/2025, Normal           No discharge procedures on file.  ED SEPSIS DOCUMENTATION   Time reflects when diagnosis was documented in both MDM as applicable and the Disposition within this note       Time User Action Codes Description Comment    4/4/2025 12:12 PM Ruperto Sauceda  Add [G31.33] Viral gastritis

## 2025-04-04 NOTE — ED ATTENDING ATTESTATION
4/4/2025  I, Satya Monson MD, saw and evaluated the patient. I have discussed the patient with the resident/non-physician practitioner and agree with the resident's/non-physician practitioner's findings, Plan of Care, and MDM as documented in the resident's/non-physician practitioner's note, except where noted. All available labs and Radiology studies were reviewed.  I was present for key portions of any procedure(s) performed by the resident/non-physician practitioner and I was immediately available to provide assistance.    At this point I agree with the current assessment done in the Emergency Department. I have conducted an independent evaluation of this patient a history and physical is as follows:    This is a 24 y.o. old female who presents to the ED for evaluation of vomiting, fever. Vomited at work, yesterday subjective fever at home. Was at work, but came to ED for evaluaiton due to symptoms.    VS and nursing notes reviewed  General: Appears in NAD, awake, alert, speaking normally in full sentences.   Well-nourished, well-developed. Appears stated age.   Head: Normocephalic, atraumatic.  Eyes: EOMI. Vision grossly normal. No subconjunctival hemorrhages or occular discharge noted. Symmetrical lids.   ENT: Atraumatic external nose and ears. No stridor. Normal phonation. No drooling. Normal swallowing.   Neck: No JVD. FROM. No goiter  CV: No pallor. Normal rate.  Lungs: No tachypnea. No respiratory distress.  MSK: Moving all extremities equally, no peripheral edema  Skin: Dry, intact. No cyanosis  Neuro: Awake, alert, GCS15. CN II-XII grossly intact. Grossly normal gait.  Psychiatric/Behavioral: Appropriate mood and affect.     A/P: This is a 24 y.o. female who presents to the ED for evaluation of vomiting. Suspect viral syndrome. Labs, COVID, IVF, Symptom management. Reeval and dispo accordingly.    ED Course       Critical Care Time  Procedures

## 2025-04-29 ENCOUNTER — ANNUAL EXAM (OUTPATIENT)
Dept: OBGYN CLINIC | Facility: CLINIC | Age: 25
End: 2025-04-29
Payer: COMMERCIAL

## 2025-04-29 VITALS
SYSTOLIC BLOOD PRESSURE: 110 MMHG | DIASTOLIC BLOOD PRESSURE: 62 MMHG | WEIGHT: 106 LBS | BODY MASS INDEX: 18.78 KG/M2 | HEIGHT: 63 IN

## 2025-04-29 DIAGNOSIS — Z00.00 WELL WOMAN EXAM WITHOUT GYNECOLOGICAL EXAM: Primary | ICD-10-CM

## 2025-04-29 DIAGNOSIS — Z12.4 SCREENING FOR MALIGNANT NEOPLASM OF THE CERVIX: ICD-10-CM

## 2025-04-29 PROCEDURE — G0145 SCR C/V CYTO,THINLAYER,RESCR: HCPCS | Performed by: PHYSICIAN ASSISTANT

## 2025-04-29 PROCEDURE — S0612 ANNUAL GYNECOLOGICAL EXAMINA: HCPCS | Performed by: PHYSICIAN ASSISTANT

## 2025-04-29 NOTE — PROGRESS NOTES
ASSESSMENT & PLAN:   Diagnoses and all orders for this visit:    Well woman exam without gynecological exam  -     Liquid-based pap, screening    Screening for malignant neoplasm of the cervix  -     Liquid-based pap, screening          The following were reviewed in today's visit: ASCCP guidelines, Gardasil vaccination, STD testing breast self exam, STD testing, exercise, and healthy diet.    Patient to return to office in yearly for annual exam.     All questions have been answered to her satisfaction.        CC:  Annual Gynecologic Examination  Chief Complaint   Patient presents with    Gynecologic Exam     Pt is here for her yearly exam. Pap due.  Last pap 10/18/2022 Neg pap          HPI: Milvia Don is a 24 y.o.  who presents for annual gynecologic examination.  She has the following concerns:  none today.       Health Maintenance:    Exercise: intermittently, working on improving diet and frequency of exercise  Breast exams/breast awareness: yes    Past Medical History:   Diagnosis Date    Allergic N/A    Allergic rhinitis     Asthma     Congenital hemihypertrophy     Depression 2020    Early satiety     Exotropia     Gastric mucosal hypertrophy     GERD (gastroesophageal reflux disease)     Leg length discrepancy     Leg length discrepancy 2016    Neuropathy 10/17/2023    Occasional tremors     Orthostasis     RAD (reactive airway disease)     Syncope     Resolved: 2017    Tinnitus     Visual impairment     Lazy eye       Past Surgical History:   Procedure Laterality Date    ESOPHAGOGASTRODUODENOSCOPY      with biopsy    FOOT SURGERY      MULTIPLE TOOTH EXTRACTIONS N/A 2017    Procedure: COMPLETE BONY IMPACTED EXTRACTION TEETH # 1, 16, 17, 32;  Surgeon: Robyn Dailey DMD;  Location: [facility];  Service:     OVARIAN CYST REMOVAL      MA EGD TRANSORAL BIOPSY SINGLE/MULTIPLE N/A 2017    Procedure: ESOPHAGOGASTRODUODENOSCOPY (EGD);  Surgeon: Fazal  MD Stefan;  Location: [facility];  Service: Pediatric Gastrointestinal       Past OB/Gyn History:   Patient's last menstrual period was 04/09/2025 (approximate).    Last Pap: 2022 : no abnormalities  History of abnormal Pap smear: no  HPV vaccine completed: yes    Patient is not currently sexually active.   STD testing: no  Current contraception:none      Family History  Family History   Problem Relation Age of Onset    No Known Problems Mother     No Known Problems Father     Asthma Sister     Autoimmune disease Sister         Ulcerative Colitis    Asthma Brother     Depression Brother     Diabetes Maternal Grandmother     No Known Problems Maternal Grandfather     No Known Problems Paternal Grandmother     Cancer Paternal Grandfather     No Known Problems Maternal Aunt     No Known Problems Maternal Aunt     No Known Problems Maternal Aunt     No Known Problems Maternal Aunt     No Known Problems Paternal Aunt     Asthma Other     Kidney cancer Family     Asthma Family     Heart disease Family     Kidney cancer Family     Other Family         Cardiac disorder    Miscarriages / Stillbirths Neg Hx     Substance Abuse Neg Hx        Family history of uterine or ovarian cancer: no  Family history of breast cancer: no  Family history of colon cancer: no    Social History:  Social History     Socioeconomic History    Marital status: Single     Spouse name: Not on file    Number of children: Not on file    Years of education: Not on file    Highest education level: Not on file   Occupational History    Occupation: Full time student   Tobacco Use    Smoking status: Never    Smokeless tobacco: Never   Vaping Use    Vaping status: Never Used   Substance and Sexual Activity    Alcohol use: Yes     Comment: rarely    Drug use: No    Sexual activity: Never   Other Topics Concern    Not on file   Social History Narrative    Brushes teeth twice a day    Lives with parents    No tobacco/smoke exposure    Pets/animals: Dog     "Sleeps 8 - 10 hours a day     Social Drivers of Health     Financial Resource Strain: Low Risk  (3/2/2021)    Overall Financial Resource Strain (CARDIA)     Difficulty of Paying Living Expenses: Not hard at all   Food Insecurity: No Food Insecurity (4/2/2025)    Hunger Vital Sign     Worried About Running Out of Food in the Last Year: Never true     Ran Out of Food in the Last Year: Never true   Transportation Needs: No Transportation Needs (4/2/2025)    PRAPARE - Transportation     Lack of Transportation (Medical): No     Lack of Transportation (Non-Medical): No   Physical Activity: Inactive (3/2/2021)    Exercise Vital Sign     Days of Exercise per Week: 0 days     Minutes of Exercise per Session: 0 min   Stress: Stress Concern Present (3/2/2021)    [employer] - Occupational Stress Questionnaire     Feeling of Stress : To some extent   Social Connections: Unknown (6/18/2024)    Received from [facility]     How often do you feel lonely or isolated from those around you? (Adult - for ages 18 years and over): Not on file   Intimate Partner Violence: Not At Risk (3/2/2021)    Humiliation, Afraid, Rape, and Kick questionnaire     Fear of Current or Ex-Partner: No     Emotionally Abused: No     Physically Abused: No     Sexually Abused: No   Housing Stability: Unknown (4/2/2025)    Housing Stability Vital Sign     Unable to Pay for Housing in the Last Year: No     Number of Times Moved in the Last Year: Not on file     Homeless in the Last Year: No     Domestic violence screen: negative    Allergies:  Allergies   Allergen Reactions    Eggs Or Egg-Derived Products - Food Allergy Hives    Other Hives, Itching and Other (See Comments)     Cold water/temperatures  Pt has passed out from the cold  \"cold urticaria\"    Seasonal Ic  [Cholestatin]        Medications:  No current outpatient medications on file.    Review of Systems:  Review of Systems   Constitutional:  Negative " "for chills, fever and unexpected weight change.   Respiratory:  Negative for shortness of breath.    Cardiovascular:  Negative for chest pain.   Gastrointestinal:  Negative for abdominal pain, diarrhea, nausea and vomiting.   Skin:  Negative for rash.   Psychiatric/Behavioral:  Negative for dysphoric mood. The patient is not nervous/anxious.          Physical Exam:  /62 (BP Location: Right arm, Patient Position: Sitting, Cuff Size: Standard)   Ht 5' 2.5\" (1.588 m)   Wt 48.1 kg (106 lb)   LMP 04/09/2025 (Approximate)   BMI 19.08 kg/m²    Physical Exam  Constitutional:       Appearance: Normal appearance.      Comments: thin   Genitourinary:      Vulva and urethral meatus normal.      No lesions in the vagina.      Right Labia: No rash or lesions.     Left Labia: No lesions or rash.     No vaginal discharge, erythema or bleeding.        Right Adnexa: not tender and no mass present.     Left Adnexa: not tender and no mass present.     No cervical discharge or lesion.      Uterus is not tender.   Breasts:     Breasts are symmetrical.      Right: No mass or skin change.      Left: No mass or skin change.   HENT:      Head: Normocephalic and atraumatic.   Cardiovascular:      Rate and Rhythm: Normal rate and regular rhythm.      Heart sounds: Normal heart sounds. No murmur heard.     No friction rub. No gallop.   Pulmonary:      Effort: Pulmonary effort is normal.      Breath sounds: Normal breath sounds. No wheezing, rhonchi or rales.   Abdominal:      General: Abdomen is flat. There is no distension.      Palpations: Abdomen is soft.      Tenderness: There is no abdominal tenderness.   Musculoskeletal:      Cervical back: Neck supple.   Lymphadenopathy:      Upper Body:      Right upper body: No axillary adenopathy.      Left upper body: No axillary adenopathy.   Neurological:      General: No focal deficit present.      Mental Status: She is alert.   Skin:     General: Skin is warm and dry.   Psychiatric:    "      Mood and Affect: Mood normal.         Behavior: Behavior normal.   Vitals reviewed.

## 2025-05-02 ENCOUNTER — RESULTS FOLLOW-UP (OUTPATIENT)
Dept: OBGYN CLINIC | Facility: CLINIC | Age: 25
End: 2025-05-02

## 2025-05-02 LAB
LAB AP GYN PRIMARY INTERPRETATION: NORMAL
Lab: NORMAL

## 2025-06-04 ENCOUNTER — APPOINTMENT (OUTPATIENT)
Dept: LAB | Facility: CLINIC | Age: 25
End: 2025-06-04
Payer: COMMERCIAL

## 2025-06-04 DIAGNOSIS — Z13.6 SCREENING FOR CARDIOVASCULAR CONDITION: ICD-10-CM

## 2025-06-04 DIAGNOSIS — Z00.00 ANNUAL PHYSICAL EXAM: ICD-10-CM

## 2025-06-04 DIAGNOSIS — Z13.1 SCREENING FOR DIABETES MELLITUS: ICD-10-CM

## 2025-06-04 LAB
ANION GAP SERPL CALCULATED.3IONS-SCNC: 7 MMOL/L (ref 4–13)
BASOPHILS # BLD AUTO: 0.02 THOUSANDS/ÂΜL (ref 0–0.1)
BASOPHILS NFR BLD AUTO: 1 % (ref 0–1)
BUN SERPL-MCNC: 9 MG/DL (ref 5–25)
CALCIUM SERPL-MCNC: 8.8 MG/DL (ref 8.4–10.2)
CHLORIDE SERPL-SCNC: 105 MMOL/L (ref 96–108)
CHOLEST SERPL-MCNC: 142 MG/DL (ref ?–200)
CO2 SERPL-SCNC: 27 MMOL/L (ref 21–32)
CREAT SERPL-MCNC: 0.59 MG/DL (ref 0.6–1.3)
EOSINOPHIL # BLD AUTO: 0.2 THOUSAND/ÂΜL (ref 0–0.61)
EOSINOPHIL NFR BLD AUTO: 5 % (ref 0–6)
ERYTHROCYTE [DISTWIDTH] IN BLOOD BY AUTOMATED COUNT: 13.7 % (ref 11.6–15.1)
EST. AVERAGE GLUCOSE BLD GHB EST-MCNC: 100 MG/DL
GFR SERPL CREATININE-BSD FRML MDRD: 127 ML/MIN/1.73SQ M
GLUCOSE P FAST SERPL-MCNC: 93 MG/DL (ref 65–99)
HBA1C MFR BLD: 5.1 %
HCT VFR BLD AUTO: 36.4 % (ref 34.8–46.1)
HDLC SERPL-MCNC: 69 MG/DL
HGB BLD-MCNC: 11.4 G/DL (ref 11.5–15.4)
IMM GRANULOCYTES # BLD AUTO: 0.01 THOUSAND/UL (ref 0–0.2)
IMM GRANULOCYTES NFR BLD AUTO: 0 % (ref 0–2)
LDLC SERPL CALC-MCNC: 67 MG/DL (ref 0–100)
LYMPHOCYTES # BLD AUTO: 1.52 THOUSANDS/ÂΜL (ref 0.6–4.47)
LYMPHOCYTES NFR BLD AUTO: 39 % (ref 14–44)
MCH RBC QN AUTO: 29.2 PG (ref 26.8–34.3)
MCHC RBC AUTO-ENTMCNC: 31.3 G/DL (ref 31.4–37.4)
MCV RBC AUTO: 93 FL (ref 82–98)
MONOCYTES # BLD AUTO: 0.37 THOUSAND/ÂΜL (ref 0.17–1.22)
MONOCYTES NFR BLD AUTO: 10 % (ref 4–12)
NEUTROPHILS # BLD AUTO: 1.74 THOUSANDS/ÂΜL (ref 1.85–7.62)
NEUTS SEG NFR BLD AUTO: 45 % (ref 43–75)
NRBC BLD AUTO-RTO: 0 /100 WBCS
PLATELET # BLD AUTO: 199 THOUSANDS/UL (ref 149–390)
PMV BLD AUTO: 12.3 FL (ref 8.9–12.7)
POTASSIUM SERPL-SCNC: 3.9 MMOL/L (ref 3.5–5.3)
RBC # BLD AUTO: 3.91 MILLION/UL (ref 3.81–5.12)
SODIUM SERPL-SCNC: 139 MMOL/L (ref 135–147)
TRIGL SERPL-MCNC: 30 MG/DL (ref ?–150)
WBC # BLD AUTO: 3.86 THOUSAND/UL (ref 4.31–10.16)

## 2025-06-04 PROCEDURE — 80061 LIPID PANEL: CPT

## 2025-06-04 PROCEDURE — 36415 COLL VENOUS BLD VENIPUNCTURE: CPT

## 2025-06-04 PROCEDURE — 85025 COMPLETE CBC W/AUTO DIFF WBC: CPT

## 2025-06-04 PROCEDURE — 80048 BASIC METABOLIC PNL TOTAL CA: CPT

## 2025-06-04 PROCEDURE — 83036 HEMOGLOBIN GLYCOSYLATED A1C: CPT

## 2025-06-06 ENCOUNTER — RESULTS FOLLOW-UP (OUTPATIENT)
Dept: FAMILY MEDICINE CLINIC | Facility: CLINIC | Age: 25
End: 2025-06-06

## (undated) DEVICE — SYRINGE 20ML LL

## (undated) DEVICE — NEEDLE 25G X 1 1/2

## (undated) DEVICE — DENTAL BURR SIDE WHITE

## (undated) DEVICE — STERILE MANDIBLE PACK: Brand: CARDINAL HEALTH

## (undated) DEVICE — SUT CHROMIC 3-0 SH 27 IN G122H

## (undated) DEVICE — SURGIFOAM 7 X 12 SPONGE ABS

## (undated) DEVICE — INTENDED FOR TISSUE SEPARATION, AND OTHER PROCEDURES THAT REQUIRE A SHARP SURGICAL BLADE TO PUNCTURE OR CUT.: Brand: BARD-PARKER ® CARBON RIB-BACK BLADES

## (undated) DEVICE — GLOVE SRG BIOGEL ECLIPSE 6.5

## (undated) DEVICE — DENTAL BURR ROUND WHITE

## (undated) DEVICE — IV CATH INTROCAN 18G X 1 1/4 SAFETY

## (undated) DEVICE — SINGLE-USE BIOPSY FORCEPS: Brand: RADIAL JAW 4

## (undated) DEVICE — GLOVE INDICATOR PI UNDERGLOVE SZ 6.5 BLUE

## (undated) DEVICE — 2000CC GUARDIAN II: Brand: GUARDIAN